# Patient Record
Sex: FEMALE | Race: WHITE | NOT HISPANIC OR LATINO | Employment: OTHER
[De-identification: names, ages, dates, MRNs, and addresses within clinical notes are randomized per-mention and may not be internally consistent; named-entity substitution may affect disease eponyms.]

---

## 2021-05-26 ENCOUNTER — RECORDS - HEALTHEAST (OUTPATIENT)
Dept: ADMINISTRATIVE | Facility: OTHER | Age: 81
End: 2021-05-26

## 2021-05-30 ENCOUNTER — AMBULATORY - HEALTHEAST (OUTPATIENT)
Dept: SURGERY | Facility: CLINIC | Age: 81
End: 2021-05-30

## 2021-05-30 DIAGNOSIS — Z11.59 ENCOUNTER FOR SCREENING FOR OTHER VIRAL DISEASES: ICD-10-CM

## 2021-06-26 DIAGNOSIS — Z11.59 ENCOUNTER FOR SCREENING FOR OTHER VIRAL DISEASES: ICD-10-CM

## 2021-07-09 RX ORDER — OXYBUTYNIN CHLORIDE 10 MG/1
10 TABLET, EXTENDED RELEASE ORAL DAILY
COMMUNITY

## 2021-07-09 RX ORDER — LISINOPRIL AND HYDROCHLOROTHIAZIDE 12.5; 2 MG/1; MG/1
1 TABLET ORAL DAILY
COMMUNITY

## 2021-07-09 RX ORDER — CALCIUM CARBONATE 500(1250)
1 TABLET ORAL 2 TIMES DAILY
Status: ON HOLD | COMMUNITY
End: 2021-07-16

## 2021-07-13 NOTE — PROVIDER NOTIFICATION
Discharge plan according to [facility]:       07/09/21 9272   Discharge Planning   Patient/Family Anticipates Transition to home with family   Living Arrangements   People in home child(tin), adult   Type of Residence Private Residence   Is your private residence a single family home or apartment? Single family home   Once home, are you able to live on one level? Yes   Which level? Main Level   Bathroom Shower/Tub Walk-in shower   Equipment Currently Used at Home none   Support System   Support Systems Children   Do you have someone available to stay with you one or two nights once you are home? Yes

## 2021-07-15 RX ORDER — ACETAMINOPHEN 325 MG/1
325-650 TABLET ORAL EVERY 6 HOURS PRN
Status: ON HOLD | COMMUNITY
End: 2021-07-16

## 2021-07-15 ASSESSMENT — MIFFLIN-ST. JEOR: SCORE: 1229.26

## 2021-07-15 NOTE — TREATMENT PLAN
Orthopedic Surgery Pre-Op Plan: Maryann Corrales  pre-op review. This is NOT an H&P   Surgeon: Dr. Phillips   [facility]: [facility]  Name of Surgery: Right Total Knee Arthroplasty   Date of Surgery: 7/16/21  H&P: Completed 6/21/21 by Dr. Carlene Wilson at [facility].   History of ASA, NSAIDS, vitamin and/or herbal supplements within 10 days: No  History of blood thinners: No    Plan:   1) Discharge Plan: Home with assist of Family. Please see Discharge Planning section near bottom of this note for further details.     2) History of Vertebral Aneurysm- S/P Coiling:     3) Hypertension: Appears well controlled on lisinopril-hydrochlorothiazide.  Instructed to hold lisinopril-hydrochlorothiazide on the day of surgery.    4) History of Breast Cancer (DCIS) S/P Lumpectomy: Currently in remission.  Follows with Dr. Alexis at [facility].    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 Alfaro at [facility].    6) Overactive Bladder: on oxybutynin.     7) Chronic Kidney Disease- Stage 3a: Most recent creatinine 1.35, GFR 37 on 6/21/2021.  Kidney function appears relatively stable. I recommend avoiding nephrotoxins like NSAIDS, promoting good post-op hydration and monitoring post-op kidney function closely.      Patient appears medically optimized for upcoming surgery. I would recommend Hospitalist Consult to assist with medical management. Please call me below with any questions on this patient.     Review of Systems Notable for: History of vertebral aneurysm-s/p coiling, hypertension, history of breast cancer-s/p lumpectomy, history of stage Ia endometrioid adenocarcinoma of right ovary-s/p total abdominal hysterectomy and bilateral salpingo-oophorectomy, overactive bladder, chronic kidney disease-stage 3a.    Past Medical History:   Past Medical History:    Diagnosis Date     Arthritis      Brain aneurysm      Hypertension      Macular degeneration      Overactive bladder      Past Surgical History:   Procedure Laterality Date     ORTHOPEDIC SURGERY         Current Medications:  Patient's Medications   New Prescriptions    No medications on file   Previous Medications    ACETAMINOPHEN (TYLENOL) 325 MG TABLET    Take 325-650 mg by mouth every 6 hours as needed for mild pain    CALCIUM CARBONATE (OS-EMILY) 500 MG TABLET    Take 1 tablet by mouth 2 times daily    LISINOPRIL-HYDROCHLOROTHIAZIDE (ZESTORETIC) 20-12.5 MG TABLET    Take 1 tablet by mouth daily    OXYBUTYNIN ER (DITROPAN-XL) 10 MG 24 HR TABLET    Take 10 mg by mouth daily   Modified Medications    No medications on file   Discontinued Medications    No medications on file       ALLERGIES:  Allergies   Allergen Reactions     Latex        Social History  Social History     Tobacco Use     Smoking status: Former Smoker     Smokeless tobacco: Never Used   Substance Use Topics     Alcohol use: Yes     Comment: 0-1 etoh per week     Drug use: Not Currently       Any Abnormal Recent Diagnostics? Yes  Creatinine 1.35, GFR 37 on 6/21/2021: Has chronic kidney disease-stage 3a.  Recommend avoiding nephrotoxins like NSAIDs and monitoring post-op kidney function closely.    Discharge Planning:   Discharge plan according to [facility]:       07/09/21 0691   Discharge Planning   Patient/Family Anticipates Transition to home with family   Living Arrangements   People in home child(tin), adult   Type of Residence Private Residence   Is your private residence a single family home or apartment? Single family home   Once home, are you able to live on one level? Yes   Which level? Main Level   Bathroom Shower/Tub Walk-in shower   Equipment Currently Used at Home none   Support System   Support Systems Children   Do you have someone available to stay with you one or two nights once you are home? Yes              Rashel  MARLEN Clay, CNP   Advanced Practice Nurse Navigator- Orthopedics  [facility]   Phone: [phone]

## 2021-07-16 ENCOUNTER — ANCILLARY PROCEDURE (OUTPATIENT)
Dept: ULTRASOUND IMAGING | Facility: CLINIC | Age: 81
End: 2021-07-16
Attending: ANESTHESIOLOGY
Payer: MEDICARE

## 2021-07-16 ENCOUNTER — ANESTHESIA EVENT (OUTPATIENT)
Dept: SURGERY | Facility: CLINIC | Age: 81
End: 2021-07-16
Payer: MEDICARE

## 2021-07-16 ENCOUNTER — ANESTHESIA (OUTPATIENT)
Dept: SURGERY | Facility: CLINIC | Age: 81
End: 2021-07-16
Payer: MEDICARE

## 2021-07-16 ENCOUNTER — HOSPITAL ENCOUNTER (OUTPATIENT)
Facility: CLINIC | Age: 81
Discharge: HOME | End: 2021-07-17
Attending: ORTHOPAEDIC SURGERY | Admitting: ORTHOPAEDIC SURGERY
Payer: MEDICARE

## 2021-07-16 DIAGNOSIS — M17.11 PRIMARY OSTEOARTHRITIS OF RIGHT KNEE: Primary | ICD-10-CM

## 2021-07-16 PROBLEM — D05.11 DUCTAL CARCINOMA IN SITU (DCIS) OF RIGHT BREAST: Status: ACTIVE | Noted: 2021-07-16

## 2021-07-16 PROBLEM — C56.1 MALIGNANT NEOPLASM OF RIGHT OVARY (H): Status: ACTIVE | Noted: 2019-10-18

## 2021-07-16 PROBLEM — D05.12 BREAST NEOPLASM, TIS (DCIS), LEFT: Status: ACTIVE | Noted: 2017-11-07

## 2021-07-16 PROBLEM — E78.5 DYSLIPIDEMIA: Status: ACTIVE | Noted: 2021-07-16

## 2021-07-16 PROBLEM — E55.9 VITAMIN D DEFICIENCY: Status: ACTIVE | Noted: 2021-07-16

## 2021-07-16 PROCEDURE — 250N000011 HC RX IP 250 OP 636: Performed by: NURSE ANESTHETIST, CERTIFIED REGISTERED

## 2021-07-16 PROCEDURE — 250N000011 HC RX IP 250 OP 636: Performed by: ANESTHESIOLOGY

## 2021-07-16 PROCEDURE — 250N000009 HC RX 250: Performed by: NURSE ANESTHETIST, CERTIFIED REGISTERED

## 2021-07-16 PROCEDURE — 258N000003 HC RX IP 258 OP 636: Performed by: ORTHOPAEDIC SURGERY

## 2021-07-16 PROCEDURE — 250N000009 HC RX 250: Performed by: PHYSICIAN ASSISTANT

## 2021-07-16 PROCEDURE — 999N000141 HC STATISTIC PRE-PROCEDURE NURSING ASSESSMENT: Performed by: ORTHOPAEDIC SURGERY

## 2021-07-16 PROCEDURE — 710N000010 HC RECOVERY PHASE 1, LEVEL 2, PER MIN: Performed by: ORTHOPAEDIC SURGERY

## 2021-07-16 PROCEDURE — 250N000011 HC RX IP 250 OP 636: Performed by: PHYSICIAN ASSISTANT

## 2021-07-16 PROCEDURE — 370N000017 HC ANESTHESIA TECHNICAL FEE, PER MIN: Performed by: ORTHOPAEDIC SURGERY

## 2021-07-16 PROCEDURE — 250N000013 HC RX MED GY IP 250 OP 250 PS 637: Performed by: ORTHOPAEDIC SURGERY

## 2021-07-16 PROCEDURE — 250N000009 HC RX 250: Performed by: ORTHOPAEDIC SURGERY

## 2021-07-16 PROCEDURE — 272N000001 HC OR GENERAL SUPPLY STERILE: Performed by: ORTHOPAEDIC SURGERY

## 2021-07-16 PROCEDURE — 258N000003 HC RX IP 258 OP 636: Performed by: ANESTHESIOLOGY

## 2021-07-16 PROCEDURE — 258N000003 HC RX IP 258 OP 636: Performed by: NURSE ANESTHETIST, CERTIFIED REGISTERED

## 2021-07-16 PROCEDURE — 250N000013 HC RX MED GY IP 250 OP 250 PS 637: Performed by: PHYSICIAN ASSISTANT

## 2021-07-16 PROCEDURE — 258N000001 HC RX 258: Performed by: ORTHOPAEDIC SURGERY

## 2021-07-16 PROCEDURE — 360N000077 HC SURGERY LEVEL 4, PER MIN: Performed by: ORTHOPAEDIC SURGERY

## 2021-07-16 PROCEDURE — C1776 JOINT DEVICE (IMPLANTABLE): HCPCS | Performed by: ORTHOPAEDIC SURGERY

## 2021-07-16 PROCEDURE — 99207 PR CDG-CODE CATEGORY CHANGED: CPT | Performed by: FAMILY MEDICINE

## 2021-07-16 PROCEDURE — 250N000009 HC RX 250: Performed by: ANESTHESIOLOGY

## 2021-07-16 PROCEDURE — 278N000051 HC OR IMPLANT GENERAL: Performed by: ORTHOPAEDIC SURGERY

## 2021-07-16 PROCEDURE — 250N000013 HC RX MED GY IP 250 OP 250 PS 637: Performed by: FAMILY MEDICINE

## 2021-07-16 PROCEDURE — 99203 OFFICE O/P NEW LOW 30 MIN: CPT | Performed by: FAMILY MEDICINE

## 2021-07-16 PROCEDURE — 250N000011 HC RX IP 250 OP 636: Performed by: ORTHOPAEDIC SURGERY

## 2021-07-16 DEVICE — IMPLANTABLE DEVICE
Type: IMPLANTABLE DEVICE | Site: KNEE | Status: FUNCTIONAL
Brand: PERSONA™

## 2021-07-16 DEVICE — IMP PATELLA ZIM KNEE ALL POLLY 35MM 42-5400-000-35: Type: IMPLANTABLE DEVICE | Site: KNEE | Status: FUNCTIONAL

## 2021-07-16 DEVICE — IMP TIBIAL ZIM PSN NP STM 5DEG SZ ER 42-5320-071-02: Type: IMPLANTABLE DEVICE | Site: KNEE | Status: FUNCTIONAL

## 2021-07-16 DEVICE — IMPLANTABLE DEVICE: Type: IMPLANTABLE DEVICE | Site: KNEE | Status: FUNCTIONAL

## 2021-07-16 DEVICE — BONE CEMENT RADIOPAQUE SIMPLEX HV FULL DOSE 6194-1-001: Type: IMPLANTABLE DEVICE | Site: KNEE | Status: FUNCTIONAL

## 2021-07-16 RX ORDER — HYDROMORPHONE HCL IN WATER/PF 6 MG/30 ML
0.2 PATIENT CONTROLLED ANALGESIA SYRINGE INTRAVENOUS
Status: DISCONTINUED | OUTPATIENT
Start: 2021-07-16 | End: 2021-07-17 | Stop reason: HOSPADM

## 2021-07-16 RX ORDER — ASPIRIN 81 MG/1
81 TABLET ORAL 2 TIMES DAILY
Status: DISCONTINUED | OUTPATIENT
Start: 2021-07-16 | End: 2021-07-17 | Stop reason: HOSPADM

## 2021-07-16 RX ORDER — HALOPERIDOL 5 MG/ML
1 INJECTION INTRAMUSCULAR
Status: DISCONTINUED | OUTPATIENT
Start: 2021-07-16 | End: 2021-07-16 | Stop reason: HOSPADM

## 2021-07-16 RX ORDER — FENTANYL CITRATE 50 UG/ML
50 INJECTION, SOLUTION INTRAMUSCULAR; INTRAVENOUS
Status: DISCONTINUED | OUTPATIENT
Start: 2021-07-16 | End: 2021-07-16 | Stop reason: HOSPADM

## 2021-07-16 RX ORDER — CEFAZOLIN SODIUM 2 G/100ML
2 INJECTION, SOLUTION INTRAVENOUS SEE ADMIN INSTRUCTIONS
Status: DISCONTINUED | OUTPATIENT
Start: 2021-07-16 | End: 2021-07-16 | Stop reason: HOSPADM

## 2021-07-16 RX ORDER — SODIUM CHLORIDE, SODIUM LACTATE, POTASSIUM CHLORIDE, CALCIUM CHLORIDE 600; 310; 30; 20 MG/100ML; MG/100ML; MG/100ML; MG/100ML
INJECTION, SOLUTION INTRAVENOUS CONTINUOUS
Status: DISCONTINUED | OUTPATIENT
Start: 2021-07-16 | End: 2021-07-16 | Stop reason: HOSPADM

## 2021-07-16 RX ORDER — VIT A/VIT C/VIT E/ZINC/COPPER 2148-113
1 TABLET ORAL 2 TIMES DAILY
COMMUNITY

## 2021-07-16 RX ORDER — GLYCOPYRROLATE 0.2 MG/ML
INJECTION, SOLUTION INTRAMUSCULAR; INTRAVENOUS PRN
Status: DISCONTINUED | OUTPATIENT
Start: 2021-07-16 | End: 2021-07-16

## 2021-07-16 RX ORDER — BUPIVACAINE HYDROCHLORIDE 5 MG/ML
INJECTION, SOLUTION EPIDURAL; INTRACAUDAL
Status: COMPLETED | OUTPATIENT
Start: 2021-07-16 | End: 2021-07-16

## 2021-07-16 RX ORDER — HYDROXYZINE HYDROCHLORIDE 10 MG/1
10 TABLET, FILM COATED ORAL EVERY 6 HOURS PRN
Status: DISCONTINUED | OUTPATIENT
Start: 2021-07-16 | End: 2021-07-17 | Stop reason: HOSPADM

## 2021-07-16 RX ORDER — POLYETHYLENE GLYCOL 3350 17 G/17G
17 POWDER, FOR SOLUTION ORAL DAILY
Status: DISCONTINUED | OUTPATIENT
Start: 2021-07-17 | End: 2021-07-17 | Stop reason: HOSPADM

## 2021-07-16 RX ORDER — DIPHENHYDRAMINE HYDROCHLORIDE 50 MG/ML
12.5 INJECTION INTRAMUSCULAR; INTRAVENOUS EVERY 6 HOURS PRN
Status: DISCONTINUED | OUTPATIENT
Start: 2021-07-16 | End: 2021-07-16 | Stop reason: HOSPADM

## 2021-07-16 RX ORDER — DEXAMETHASONE SODIUM PHOSPHATE 10 MG/ML
INJECTION INTRAMUSCULAR; INTRAVENOUS PRN
Status: DISCONTINUED | OUTPATIENT
Start: 2021-07-16 | End: 2021-07-16

## 2021-07-16 RX ORDER — LIDOCAINE 40 MG/G
CREAM TOPICAL
Status: DISCONTINUED | OUTPATIENT
Start: 2021-07-16 | End: 2021-07-16

## 2021-07-16 RX ORDER — FENTANYL CITRATE 50 UG/ML
50 INJECTION, SOLUTION INTRAMUSCULAR; INTRAVENOUS
Status: DISCONTINUED | OUTPATIENT
Start: 2021-07-16 | End: 2021-07-16

## 2021-07-16 RX ORDER — ONDANSETRON 2 MG/ML
4 INJECTION INTRAMUSCULAR; INTRAVENOUS EVERY 30 MIN PRN
Status: DISCONTINUED | OUTPATIENT
Start: 2021-07-16 | End: 2021-07-16 | Stop reason: HOSPADM

## 2021-07-16 RX ORDER — ONDANSETRON 4 MG/1
4 TABLET, ORALLY DISINTEGRATING ORAL EVERY 30 MIN PRN
Status: DISCONTINUED | OUTPATIENT
Start: 2021-07-16 | End: 2021-07-16 | Stop reason: HOSPADM

## 2021-07-16 RX ORDER — NALOXONE HYDROCHLORIDE 0.4 MG/ML
0.2 INJECTION, SOLUTION INTRAMUSCULAR; INTRAVENOUS; SUBCUTANEOUS
Status: DISCONTINUED | OUTPATIENT
Start: 2021-07-16 | End: 2021-07-17 | Stop reason: HOSPADM

## 2021-07-16 RX ORDER — ONDANSETRON 4 MG/1
4 TABLET, ORALLY DISINTEGRATING ORAL EVERY 6 HOURS PRN
Status: DISCONTINUED | OUTPATIENT
Start: 2021-07-16 | End: 2021-07-17 | Stop reason: HOSPADM

## 2021-07-16 RX ORDER — ACETAMINOPHEN 325 MG/1
650 TABLET ORAL EVERY 4 HOURS PRN
Status: DISCONTINUED | OUTPATIENT
Start: 2021-07-19 | End: 2021-07-17 | Stop reason: HOSPADM

## 2021-07-16 RX ORDER — MAGNESIUM SULFATE 4 G/50ML
4 INJECTION INTRAVENOUS ONCE
Status: DISCONTINUED | OUTPATIENT
Start: 2021-07-16 | End: 2021-07-16 | Stop reason: HOSPADM

## 2021-07-16 RX ORDER — SODIUM CHLORIDE, SODIUM LACTATE, POTASSIUM CHLORIDE, CALCIUM CHLORIDE 600; 310; 30; 20 MG/100ML; MG/100ML; MG/100ML; MG/100ML
INJECTION, SOLUTION INTRAVENOUS CONTINUOUS
Status: ACTIVE | OUTPATIENT
Start: 2021-07-16 | End: 2021-07-17

## 2021-07-16 RX ORDER — FENTANYL CITRATE 50 UG/ML
50 INJECTION, SOLUTION INTRAMUSCULAR; INTRAVENOUS EVERY 5 MIN PRN
Status: DISCONTINUED | OUTPATIENT
Start: 2021-07-16 | End: 2021-07-16 | Stop reason: HOSPADM

## 2021-07-16 RX ORDER — PROPOFOL 10 MG/ML
INJECTION, EMULSION INTRAVENOUS CONTINUOUS PRN
Status: DISCONTINUED | OUTPATIENT
Start: 2021-07-16 | End: 2021-07-16

## 2021-07-16 RX ORDER — AMOXICILLIN 250 MG
1 CAPSULE ORAL 2 TIMES DAILY
Status: DISCONTINUED | OUTPATIENT
Start: 2021-07-16 | End: 2021-07-17 | Stop reason: HOSPADM

## 2021-07-16 RX ORDER — SODIUM CHLORIDE, SODIUM LACTATE, POTASSIUM CHLORIDE, CALCIUM CHLORIDE 600; 310; 30; 20 MG/100ML; MG/100ML; MG/100ML; MG/100ML
INJECTION, SOLUTION INTRAVENOUS CONTINUOUS
Status: DISCONTINUED | OUTPATIENT
Start: 2021-07-16 | End: 2021-07-16

## 2021-07-16 RX ORDER — LISINOPRIL AND HYDROCHLOROTHIAZIDE 12.5; 2 MG/1; MG/1
1 TABLET ORAL DAILY
Status: DISCONTINUED | OUTPATIENT
Start: 2021-07-17 | End: 2021-07-17 | Stop reason: HOSPADM

## 2021-07-16 RX ORDER — ALBUTEROL SULFATE 0.83 MG/ML
2.5 SOLUTION RESPIRATORY (INHALATION) EVERY 4 HOURS PRN
Status: DISCONTINUED | OUTPATIENT
Start: 2021-07-16 | End: 2021-07-16 | Stop reason: HOSPADM

## 2021-07-16 RX ORDER — HYDROMORPHONE HCL IN WATER/PF 6 MG/30 ML
0.2 PATIENT CONTROLLED ANALGESIA SYRINGE INTRAVENOUS EVERY 5 MIN PRN
Status: DISCONTINUED | OUTPATIENT
Start: 2021-07-16 | End: 2021-07-16 | Stop reason: HOSPADM

## 2021-07-16 RX ORDER — BISACODYL 10 MG
10 SUPPOSITORY, RECTAL RECTAL DAILY PRN
Status: DISCONTINUED | OUTPATIENT
Start: 2021-07-16 | End: 2021-07-17 | Stop reason: HOSPADM

## 2021-07-16 RX ORDER — LIDOCAINE 40 MG/G
CREAM TOPICAL
Status: DISCONTINUED | OUTPATIENT
Start: 2021-07-16 | End: 2021-07-17 | Stop reason: HOSPADM

## 2021-07-16 RX ORDER — ONDANSETRON 2 MG/ML
INJECTION INTRAMUSCULAR; INTRAVENOUS PRN
Status: DISCONTINUED | OUTPATIENT
Start: 2021-07-16 | End: 2021-07-16

## 2021-07-16 RX ORDER — CEFAZOLIN SODIUM 2 G/100ML
2 INJECTION, SOLUTION INTRAVENOUS EVERY 8 HOURS
Status: COMPLETED | OUTPATIENT
Start: 2021-07-16 | End: 2021-07-17

## 2021-07-16 RX ORDER — ACETAMINOPHEN 325 MG/1
975 TABLET ORAL EVERY 8 HOURS
Status: DISCONTINUED | OUTPATIENT
Start: 2021-07-16 | End: 2021-07-17 | Stop reason: HOSPADM

## 2021-07-16 RX ORDER — MAGNESIUM SULFATE 4 G/50ML
4 INJECTION INTRAVENOUS ONCE
Status: COMPLETED | OUTPATIENT
Start: 2021-07-16 | End: 2021-07-16

## 2021-07-16 RX ORDER — BUPIVACAINE HYDROCHLORIDE 7.5 MG/ML
INJECTION, SOLUTION INTRASPINAL PRN
Status: DISCONTINUED | OUTPATIENT
Start: 2021-07-16 | End: 2021-07-16

## 2021-07-16 RX ORDER — KETAMINE HYDROCHLORIDE 50 MG/ML
INJECTION, SOLUTION INTRAMUSCULAR; INTRAVENOUS PRN
Status: DISCONTINUED | OUTPATIENT
Start: 2021-07-16 | End: 2021-07-16

## 2021-07-16 RX ORDER — PROCHLORPERAZINE MALEATE 5 MG
5 TABLET ORAL EVERY 6 HOURS PRN
Status: DISCONTINUED | OUTPATIENT
Start: 2021-07-16 | End: 2021-07-17 | Stop reason: HOSPADM

## 2021-07-16 RX ORDER — DIPHENHYDRAMINE HCL 12.5MG/5ML
12.5 LIQUID (ML) ORAL EVERY 6 HOURS PRN
Status: DISCONTINUED | OUTPATIENT
Start: 2021-07-16 | End: 2021-07-16 | Stop reason: HOSPADM

## 2021-07-16 RX ORDER — ACETAMINOPHEN 500 MG
500-1000 TABLET ORAL EVERY 6 HOURS PRN
Status: ON HOLD | COMMUNITY
End: 2024-05-30

## 2021-07-16 RX ORDER — CEFAZOLIN SODIUM 2 G/100ML
2 INJECTION, SOLUTION INTRAVENOUS
Status: COMPLETED | OUTPATIENT
Start: 2021-07-16 | End: 2021-07-16

## 2021-07-16 RX ORDER — LIDOCAINE HYDROCHLORIDE 20 MG/ML
INJECTION, SOLUTION INFILTRATION; PERINEURAL PRN
Status: DISCONTINUED | OUTPATIENT
Start: 2021-07-16 | End: 2021-07-16

## 2021-07-16 RX ORDER — ONDANSETRON 2 MG/ML
4 INJECTION INTRAMUSCULAR; INTRAVENOUS EVERY 6 HOURS PRN
Status: DISCONTINUED | OUTPATIENT
Start: 2021-07-16 | End: 2021-07-17 | Stop reason: HOSPADM

## 2021-07-16 RX ORDER — OXYCODONE HYDROCHLORIDE 5 MG/1
5 TABLET ORAL EVERY 4 HOURS PRN
Status: DISCONTINUED | OUTPATIENT
Start: 2021-07-16 | End: 2021-07-17 | Stop reason: HOSPADM

## 2021-07-16 RX ORDER — NALOXONE HYDROCHLORIDE 0.4 MG/ML
0.4 INJECTION, SOLUTION INTRAMUSCULAR; INTRAVENOUS; SUBCUTANEOUS
Status: DISCONTINUED | OUTPATIENT
Start: 2021-07-16 | End: 2021-07-17 | Stop reason: HOSPADM

## 2021-07-16 RX ORDER — MAGNESIUM HYDROXIDE 1200 MG/15ML
LIQUID ORAL PRN
Status: DISCONTINUED | OUTPATIENT
Start: 2021-07-16 | End: 2021-07-16 | Stop reason: HOSPADM

## 2021-07-16 RX ORDER — MULTIPLE VITAMINS W/ MINERALS TAB 9MG-400MCG
1 TAB ORAL DAILY
Status: DISCONTINUED | OUTPATIENT
Start: 2021-07-17 | End: 2021-07-17 | Stop reason: HOSPADM

## 2021-07-16 RX ORDER — BUPIVACAINE HYDROCHLORIDE 7.5 MG/ML
INJECTION, SOLUTION INTRASPINAL
Status: COMPLETED | OUTPATIENT
Start: 2021-07-16 | End: 2021-07-16

## 2021-07-16 RX ORDER — OXYBUTYNIN CHLORIDE 5 MG/1
10 TABLET, EXTENDED RELEASE ORAL DAILY
Status: DISCONTINUED | OUTPATIENT
Start: 2021-07-16 | End: 2021-07-17 | Stop reason: HOSPADM

## 2021-07-16 RX ORDER — HYDROMORPHONE HCL IN WATER/PF 6 MG/30 ML
0.4 PATIENT CONTROLLED ANALGESIA SYRINGE INTRAVENOUS
Status: DISCONTINUED | OUTPATIENT
Start: 2021-07-16 | End: 2021-07-17 | Stop reason: HOSPADM

## 2021-07-16 RX ORDER — OXYCODONE HYDROCHLORIDE 5 MG/1
10 TABLET ORAL EVERY 4 HOURS PRN
Status: DISCONTINUED | OUTPATIENT
Start: 2021-07-16 | End: 2021-07-17 | Stop reason: HOSPADM

## 2021-07-16 RX ORDER — TRANEXAMIC ACID 650 MG/1
1950 TABLET ORAL ONCE
Status: COMPLETED | OUTPATIENT
Start: 2021-07-16 | End: 2021-07-16

## 2021-07-16 RX ADMIN — HYDROMORPHONE HYDROCHLORIDE 1 MG: 1 INJECTION, SOLUTION INTRAMUSCULAR; INTRAVENOUS; SUBCUTANEOUS at 12:20

## 2021-07-16 RX ADMIN — PHENYLEPHRINE HYDROCHLORIDE 0.3 MCG: 10 INJECTION INTRAVENOUS at 12:15

## 2021-07-16 RX ADMIN — ASPIRIN 81 MG: 81 TABLET, DELAYED RELEASE ORAL at 21:27

## 2021-07-16 RX ADMIN — MAGNESIUM SULFATE HEPTAHYDRATE 4 G: 80 INJECTION, SOLUTION INTRAVENOUS at 11:15

## 2021-07-16 RX ADMIN — ACETAMINOPHEN 975 MG: 325 TABLET ORAL at 13:45

## 2021-07-16 RX ADMIN — KETAMINE HYDROCHLORIDE 12.5 MG: 50 INJECTION, SOLUTION INTRAMUSCULAR; INTRAVENOUS at 11:30

## 2021-07-16 RX ADMIN — DOCUSATE SODIUM 50MG AND SENNOSIDES 8.6MG 1 TABLET: 8.6; 5 TABLET, FILM COATED ORAL at 21:27

## 2021-07-16 RX ADMIN — BUPIVACAINE HYDROCHLORIDE 20 ML: 5 INJECTION, SOLUTION EPIDURAL; INTRACAUDAL; PERINEURAL at 11:23

## 2021-07-16 RX ADMIN — OXYBUTYNIN CHLORIDE 10 MG: 5 TABLET, FILM COATED, EXTENDED RELEASE ORAL at 17:02

## 2021-07-16 RX ADMIN — FENTANYL CITRATE 50 MCG: 50 INJECTION, SOLUTION INTRAMUSCULAR; INTRAVENOUS at 11:09

## 2021-07-16 RX ADMIN — ONDANSETRON 4 MG: 2 INJECTION INTRAMUSCULAR; INTRAVENOUS at 12:55

## 2021-07-16 RX ADMIN — PROPOFOL 50 MCG/KG/MIN: 10 INJECTION, EMULSION INTRAVENOUS at 11:33

## 2021-07-16 RX ADMIN — DEXAMETHASONE SODIUM PHOSPHATE 10 MG: 10 INJECTION INTRAMUSCULAR; INTRAVENOUS at 12:11

## 2021-07-16 RX ADMIN — LIDOCAINE HYDROCHLORIDE 30 MG: 20 INJECTION, SOLUTION INFILTRATION; PERINEURAL at 11:30

## 2021-07-16 RX ADMIN — KETAMINE HYDROCHLORIDE 37.5 MG: 50 INJECTION, SOLUTION INTRAMUSCULAR; INTRAVENOUS at 11:54

## 2021-07-16 RX ADMIN — SODIUM CHLORIDE, POTASSIUM CHLORIDE, SODIUM LACTATE AND CALCIUM CHLORIDE: 600; 310; 30; 20 INJECTION, SOLUTION INTRAVENOUS at 15:16

## 2021-07-16 RX ADMIN — PHENYLEPHRINE HYDROCHLORIDE 0.3 MCG/KG/MIN: 10 INJECTION INTRAVENOUS at 12:35

## 2021-07-16 RX ADMIN — BUPIVACAINE HYDROCHLORIDE 1.5 ML: 7.5 INJECTION, SOLUTION INTRASPINAL at 11:46

## 2021-07-16 RX ADMIN — CEFAZOLIN SODIUM 2 G: 2 INJECTION, SOLUTION INTRAVENOUS at 11:24

## 2021-07-16 RX ADMIN — GLYCOPYRROLATE 0.3 MG: 0.2 INJECTION, SOLUTION INTRAMUSCULAR; INTRAVENOUS at 11:55

## 2021-07-16 RX ADMIN — CEFAZOLIN SODIUM 2 G: 2 INJECTION, SOLUTION INTRAVENOUS at 21:08

## 2021-07-16 RX ADMIN — BUPIVACAINE HYDROCHLORIDE IN DEXTROSE 1.5 ML: 7.5 INJECTION, SOLUTION SUBARACHNOID at 11:46

## 2021-07-16 RX ADMIN — MIDAZOLAM HYDROCHLORIDE 1 MG: 1 INJECTION, SOLUTION INTRAMUSCULAR; INTRAVENOUS at 11:10

## 2021-07-16 RX ADMIN — TRANEXAMIC ACID 1950 MG: 650 TABLET ORAL at 10:26

## 2021-07-16 RX ADMIN — SODIUM CHLORIDE, POTASSIUM CHLORIDE, SODIUM LACTATE AND CALCIUM CHLORIDE: 600; 310; 30; 20 INJECTION, SOLUTION INTRAVENOUS at 11:00

## 2021-07-16 ASSESSMENT — MIFFLIN-ST. JEOR: SCORE: 1226.54

## 2021-07-16 NOTE — ANESTHESIA PROCEDURE NOTES
Adductor canal Procedure Note  Pre-Procedure   Staff -        Anesthesiologist:  Braulio Cedeño MD       Performed By: anesthesiologist       Location: pre-op       Procedure Start/Stop Times: 11/10/2021 11:23 AM and 7/16/2021 11:15 AM       Pre-Anesthestic Checklist: patient identified, IV checked, site marked, risks and benefits discussed, informed consent, monitors and equipment checked, pre-op evaluation, at physician/surgeon's request and post-op pain management  Timeout:       Correct Patient: Yes        Correct Procedure: Yes        Correct Site: Yes        Correct Position: Yes        Correct Laterality: Yes        Site Marked: Yes  Procedure Documentation  Procedure: Adductor canal       Diagnosis: ARTHRITIS       Laterality: right       Patient Position: supine       Skin prep: Chloraprep       Needle Type: insulated       Needle Gauge: 20.        Needle Length (Inches): 6        Ultrasound guided       1. Ultrasound was used to identify targeted nerve, plexus, vascular marker, or fascial plane and place a needle adjacent to it in real-time.       2. Ultrasound was used to visualize the spread of anesthetic in close proximity to the above referenced structure.       3. A permanent image is entered into the patient's record.       4. The visualized anatomic structures appeared normal.       5. There were no apparent abnormal pathologic findings.    Assessment/Narrative         The placement was negative for: blood aspirated, painful injection and site bleeding       Paresthesias: No.     Test dose of 3 mL at.         Test dose negative, 3 minutes after injection, for signs of intravascular, subdural, or intrathecal injection.     Bolus given via needle..        Secured via.        Insertion/Infusion Method: Single Shot       Complications: none    Medication(s) Administered   Bupivacaine 0.5% PF (Infiltration), 20 mL

## 2021-07-16 NOTE — CONSULTS
[facility] MEDICINE CONSULT NOTE   Physician requesting consult: Fortunato Phillips MD    Reason for consult: Postoperative medical management of medical co-morbidities as below    Assessment and Plan    Maryann Corrales is a 81 year old old female with a history of breast cancer, ovarian cancer, CKD 3, subarachnoid hemorrhage with brain aneurysm, hypertension, esophageal strictures and hiatal hernia, recurrent hip dislocations.  Underwent right total knee arthroplasty by Dr. Fortunato Phillips.     [facility] service was asked to evaluate patient for postoperative medical management as follows below. Please resume the home medications as reconciled and further noted below with ordered hold parameters.  Vital signs have been stable post-operatively including hemodynamically stable blood pressure and heart rate. Thank you for this consult; we will continue to follow this patient until discharge.    Procedure(s):  TOTAL KNEE ARTHROPLASTY, RIGHT  Post-operative Day: Day of Surgery  Code status:Full Code     Estimated Blood Loss:  50 mL    -Essential hypertension  -History of subarachnoid hemorrhage and brain aneurysm  Ordered home lisinopril hydrochlorothiazide with hold parameters.  -Chronic kidney disease stage III  Preoperative creatinine 1.35.  Right intravenous fluids during the evening.  Recheck creatinine level in the morning.  -Esophageal stricture  -Hiatal hernia  Not chronically on a PPI.  Will order omeprazole available as needed.  -History of breast cancer  -History of ovarian tumor  Noted.  No active management.  -Overactive bladder  Order home Ditropan XL 10 mg scheduled daily.    Hospital Problem List   No problem-specific Assessment & Plan notes found for this encounter.    Active Problems:    Knee osteoarthritis    -Reviewed the patient's preoperative H and P and updated missing elements.  -Home medication reconciliation has been reviewed.  Medications have been ordered as noted  from the home list and changes are documented above     HISTORY     Maryann Corrales is a 81 year old old female PMH significant for ovarian cancer, breast cancer, CKD 3, hypertension, esophageal stricture and hiatal hernia, recurrent hip dislocations, subarachnoid hemorrhage and brain aneurysm.  Underwent right total knee arthroplasty by Dr. Fortunato Phillips.  Patient doing well postoperatively.  Pain under good control.  No chest pain.  No shortness of breath.  No nausea or vomiting.  No lightheadedness or dizziness.  Daughter is present and supportive.  Patient denies any deficits left over from her subarachnoid hemorrhage brain aneurysm.  Preoperative creatinine 1.35.  Hemoglobin 13.2.  Has had breast cancer and a benign ovarian tumor removed.  Denies personal history of heart attack, stroke, diabetes, DVT, PE, peptic ulcer disease or sleep apnea.  Questions answered verbalize satisfaction..    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 WHICH REQUIRED COLING     Brain aneurysm      Breast neoplasm, Tis (DCIS), left 11/7/2017    Formatting of this note might be different from the original. Added automatically from request for surgery [id number] Diagnosed 2017. S/p lumpectomy 11/2017. DCIS. On tamoxifen since Dec 2017.     CKD (chronic kidney disease) stage 3, GFR 30-59 ml/min 5/26/2015     Dyslipidemia 7/16/2021     Essential hypertension 12/6/2006     Hiatal hernia 12/6/2006     Hypertension      Macular degeneration      Malignant neoplasm of right ovary (H) 10/18/2019    Formatting of this note might be different from the original. Endometroid adenocarcinoma of right ovary, stage IA s/p MORALES/BSO/omentectomy with Dr Alfaro. Follows with [facility]     Overactive bladder      Raynaud's syndrome 7/16/2021     Recurrent dislocation of right hip 4/15/2014     Stricture and  stenosis of esophagus 12/6/2006     Subarachnoid hemorrhage (H) 12/6/2006     Patient Active Problem List    Diagnosis Date Noted     Knee osteoarthritis 07/16/2021     Priority: Medium     Ductal carcinoma in situ (DCIS) of right breast 07/16/2021     Priority: Medium     Formatting of this note might be different from the original.  Diagnosed 2014       Dyslipidemia 07/16/2021     Priority: Medium     Raynaud's syndrome 07/16/2021     Priority: Medium     Vitamin D deficiency 07/16/2021     Priority: Medium     Malignant neoplasm of right ovary (H) 10/18/2019     Priority: Medium     Formatting of this note might be different from the original.  Endometroid adenocarcinoma of right ovary, stage IA s/p MORALES/BSO/omentectomy with Dr Alfaro. Follows with [facility]       Breast neoplasm, Tis (DCIS), left 11/07/2017     Priority: Medium     Formatting of this note might be different from the original.  Added automatically from request for surgery [id number]  Diagnosed 2017. S/p lumpectomy 11/2017. DCIS.  On tamoxifen since Dec 2017.       CKD (chronic kidney disease) stage 3, GFR 30-59 ml/min 05/26/2015     Priority: Medium     Overactive bladder 05/19/2015     Priority: Medium     Recurrent dislocation of right hip 04/15/2014     Priority: Medium     Basilar artery syndrome 12/06/2006     Priority: Medium     Formatting of this note might be different from the original.  WHICH REQUIRED COLING       Essential hypertension 12/06/2006     Priority: Medium     Hiatal hernia 12/06/2006     Priority: Medium     Stricture and stenosis of esophagus 12/06/2006     Priority: Medium     Subarachnoid hemorrhage (H) 12/06/2006     Priority: Medium        Surgical History   She  has a past surgical history that includes orthopedic surgery.     Past Surgical History:   Procedure Laterality Date     ORTHOPEDIC SURGERY         Family History    Reviewed, and family history is not on file.    Social History    Reviewed, and she  reports  that she has quit smoking. She has never used smokeless tobacco. She reports current alcohol use. She reports previous drug use.  Social History     Tobacco Use     Smoking status: Former Smoker     Smokeless tobacco: Never Used   Substance Use Topics     Alcohol use: Yes     Comment: 0-1 etoh per week       Allergies     Allergies   Allergen Reactions     Latex        Prior to Admission Medications      Medications Prior to Admission   Medication Sig Dispense Refill Last Dose     acetaminophen (TYLENOL) 500 MG tablet Take 500-1,000 mg by mouth every 6 hours as needed for mild pain   7/15/2021 at Unknown time     lisinopril-hydrochlorothiazide (ZESTORETIC) 20-12.5 MG tablet Take 1 tablet by mouth daily   7/15/2021 at Unknown time     Multiple Vitamins-Minerals (PRESERVISION AREDS) TABS Take 1 tablet by mouth 2 times daily   7/15/2021 at Unknown time     oxybutynin ER (DITROPAN-XL) 10 MG 24 hr tablet Take 10 mg by mouth daily   7/15/2021 at Unknown time       Review of Systems   A 12 point comprehensive review of systems was negative except as noted above.    OBJECTIVE         Physical Exam   Temp:  [97.1  F (36.2  C)-98.1  F (36.7  C)] 97.1  F (36.2  C)  Pulse:  [60-77] 69  Resp:  [10-25] 18  BP: ()/(53-88) 145/69  SpO2:  [90 %-100 %] 98 %  Body mass index is 33 kg/m .  Constitutional: awake, alert, cooperative, no apparent distress, and appears stated age  Eyes: Lids and lashes normal, pupils equal, round and reactive to light, extra ocular muscles intact, sclera clear, conjunctiva normal  ENT: Normocephalic, without obvious abnormality, atraumatic, sinuses nontender on palpation, external ears without lesions, oral pharynx with moist mucous membranes, tonsils without erythema or exudates, gums normal and good dentition.  Hematologic / Lymphatic: no cervical lymphadenopathy and no supraclavicular lymphadenopathy  Respiratory: No increased work of breathing, good air exchange, clear to auscultation  bilaterally, no crackles or wheezing  Cardiovascular: Normal apical impulse, regular rate and rhythm, normal S1 and S2, no S3 or S4, and no murmur noted  GI: No scars, normal bowel sounds, soft, non-distended, non-tender, no masses palpated, no hepatosplenomegally  Skin: normal skin color, texture, turgor, no redness, warmth, or swelling, and no rashes  Musculoskeletal: There is no redness, warmth, or swelling of the joints.  Full range of motion noted.  Motor strength is 5 out of 5 all extremities bilaterally.  Tone is normal. no lower extremity pitting edema present  Neurologic: Awake, alert, oriented to name, place and time.  Cranial nerves II-XII are grossly intact.  Motor is 5 out of 5 bilaterally.  Sensory is slightly diminished at right leg secondary to nerve block.  Neuropsychiatric: General: normal, calm and normal eye contact Level of consciousness: alert / normal Affect: normal Orientation: oriented to self, place, time and situation Memory and insight: normal, memory for past and recent events intact and thought process normal    Imaging Reviewed Personally By Myself    Radiology Results: No results found for this or any previous visit (from the past 24 hour(s)).    Labs Reviewed Personally By Myself     Results for orders placed or performed in visit on 07/16/21 (from the past 24 hour(s))   Peripheral/Paravertebral Block    Narrative    Braulio Cedeño MD     7/16/2021 11:27 AM  Adductor canal Procedure Note  Pre-Procedure   Staff -        Anesthesiologist:  Braulio Cedeño MD       Performed By: anesthesiologist       Location: pre-op       Procedure Start/Stop Times: 11/10/2021 11:23 AM and 7/16/2021 11:15   AM       Pre-Anesthestic Checklist: patient identified, IV checked, site   marked, risks and benefits discussed, informed consent, monitors and   equipment checked, pre-op evaluation, at physician/surgeon's request and   post-op pain management  Timeout:       Correct Patient: Yes        Correct  Procedure: Yes        Correct Site: Yes        Correct Position: Yes        Correct Laterality: Yes        Site Marked: Yes  Procedure Documentation  Procedure: Adductor canal       Diagnosis: ARTHRITIS       Laterality: right       Patient Position: supine       Skin prep: Chloraprep       Needle Type: insulated       Needle Gauge: 20.        Needle Length (Inches): 6        Ultrasound guided       1. Ultrasound was used to identify targeted nerve, plexus, vascular   marker, or fascial plane and place a needle adjacent to it in real-time.       2. Ultrasound was used to visualize the spread of anesthetic in close   proximity to the above referenced structure.       3. A permanent image is entered into the patient's record.       4. The visualized anatomic structures appeared normal.       5. There were no apparent abnormal pathologic findings.    Assessment/Narrative         The placement was negative for: blood aspirated, painful injection   and site bleeding       Paresthesias: No.     Test dose of 3 mL at.         Test dose negative, 3 minutes after injection, for signs of   intravascular, subdural, or intrathecal injection.     Bolus given via needle..        Secured via.        Insertion/Infusion Method: Single Shot       Complications: none    Medication(s) Administered   Bupivacaine 0.5% PF (Infiltration), 20 mL   Spinal Block    Narrative    Braulio Cedeño MD     7/16/2021 11:54 AM  Intrathecal injection Procedure Note  Pre-Procedure   Staff -        Anesthesiologist:  Braulio Cedeño MD       Performed By: anesthesiologist       Location: OR       Procedure Start/Stop Times: 7/16/2021 11:35 AM and 7/16/2021 11:46 AM       Pre-Anesthestic Checklist: patient identified, IV checked, risks and   benefits discussed, informed consent, monitors and equipment checked,   pre-op evaluation, at physician/surgeon's request and post-op pain   management  Timeout:       Correct Patient: Yes        Correct Procedure:  Yes        Correct Site: Yes        Correct Position: Yes   Procedure Documentation  Procedure: intrathecal injection       Patient Position: sitting       Skin prep: Betadine       Insertion Site: L2-3. (midline approach).       Needle Gauge: 22.        Needle Length (Inches): 4        Spinal Needle Type: PencanNo introducer used      # of attempts: 3 and  # of redirects:     Assessment/Narrative         CSF fluid: clear.    Medication(s) Administered   0.75% Hyperbaric Bupivacaine (Intrathecal), 1.5 mL  Medication Administration Time: 7/16/2021 11:46 AM    Comments:  Lot [id number]         Preoperative Labs Reviewed Personally By Myself        HEMOGLOBIN (06/21/2021 8:49 AM CDT)  HEMOGLOBIN (06/21/2021 8:49 AM CDT)   Component Value Ref Range Performed At Pathologist Signature   HEMOGLOBIN  13.2 12.0 - 16.0 g/dL [facility]     MCV  92 80 - 100 fL [facility]       HEMOGLOBIN (06/21/2021 8:49 AM CDT)   Specimen   Blood - Blood specimen (specimen)     HEMOGLOBIN (06/21/2021 8:49 AM CDT)   Performing Organization Address City/[facility]/ZIP Code Phone Number   [facility]   [address] [place] [address] [phone]     Back to top of Results       POTASSIUM (06/21/2021 8:49 AM CDT)  POTASSIUM (06/21/2021 8:49 AM CDT)   Component Value Ref Range Performed At Pathologist Signature   POTASSIUM 4.6 3.5 - 5.0 mmol/L [facility]       POTASSIUM (06/21/2021 8:49 AM CDT)   Specimen   Blood - Blood specimen (specimen)     POTASSIUM (06/21/2021 8:49 AM CDT)   Performing Organization Address City/[facility]/ZIP Code Phone Number   [facility]   [address]   [place] [address]        Back to top of Results       CREATININE (06/21/2021 8:49 AM CDT)  CREATININE (06/21/2021 8:49 AM CDT)   Component Value Ref Range Performed At Pathologist Signature    CREATININE 1.35 (H) 0.57 - 1.11 mg/dL [facility]     GFR if  45 (L) >60 ml/min/1.73m2 [facility]     GFR if not African American 37 (L) >60 ml/min/1.73m2 [facility]       CREATININE (06/21/2021 8:49 AM CDT)   Specimen   Blood - Blood specimen (specimen)     CREATININE (06/21/2021 8:49 AM CDT)   Performing Organization Address City/State/ZIP Code Phone Number   [facility]   [address] [facility] [address]           Thank you for this consultation.  Appreciate the opportunity to participate in the care of Maryann Corrales, please feel free to contact us for any questions or concerns.    Rj Major MD  Hospitalist  [facility] Medicine  [facility]  Phone: #[phone]

## 2021-07-16 NOTE — ANESTHESIA CARE TRANSFER NOTE
Patient: Maryann Corrales    Procedure(s):  TOTAL KNEE ARTHROPLASTY, RIGHT    Diagnosis: Osteoarthritis of right knee [M17.11]  Diagnosis Additional Information: No value filed.    Anesthesia Type:   Spinal     Note:    Oropharynx: oropharynx clear of all foreign objects  Level of Consciousness: drowsy  Oxygen Supplementation: room air    Independent Airway: airway patency satisfactory and stable  Dentition: dentition unchanged  Vital Signs Stable: post-procedure vital signs reviewed and stable    Patient transferred to: PACU    Handoff Report: Identifed the Patient, Identified the Reponsible Provider, Reviewed the pertinent medical history, Discussed the surgical course, Reviewed Intra-OP anesthesia mangement and issues during anesthesia, Set expectations for post-procedure period and Allowed opportunity for questions and acknowledgement of understanding      Vitals: (Last set prior to Anesthesia Care Transfer)  CRNA VITALS  7/16/2021 1247 - 7/16/2021 1321      7/16/2021             Pulse:  73    Ht Rate:  72    SpO2:  100 %        Electronically Signed By: MARLEN CAMARILLO CRNA  July 16, 2021  1:21 PM

## 2021-07-16 NOTE — ANESTHESIA PREPROCEDURE EVALUATION
Anesthesia Pre-Procedure Evaluation    Patient: Maryann Corrales   MRN: [id number] : 1940        Preoperative Diagnosis: Osteoarthritis of right knee [M17.11]   Procedure : Procedure(s):  TOTAL KNEE ARTHROPLASTY     Past Medical History:   Diagnosis Date     Arthritis      Brain aneurysm      Hypertension      Macular degeneration      Overactive bladder       Past Surgical History:   Procedure Laterality Date     ORTHOPEDIC SURGERY        Allergies   Allergen Reactions     Latex       Social History     Tobacco Use     Smoking status: Former Smoker     Smokeless tobacco: Never Used   Substance Use Topics     Alcohol use: Yes     Comment: 0-1 etoh per week      Wt Readings from Last 1 Encounters:   21 81.8 kg (180 lb 6.4 oz)        Anesthesia Evaluation   Pt has had prior anesthetic. Type: Regional.        ROS/MED HX  ENT/Pulmonary:  - neg pulmonary ROS     Neurologic: Comment: Hx aneurysm coiling      Cardiovascular:     (+) hypertension-----    METS/Exercise Tolerance:     Hematologic:  - neg hematologic  ROS     Musculoskeletal:  - neg musculoskeletal ROS     GI/Hepatic:  - neg GI/hepatic ROS     Renal/Genitourinary:     (+) renal disease, type: CRI,     Endo:  - neg endo ROS     Psychiatric/Substance Use:  - neg psychiatric ROS     Infectious Disease:  - neg infectious disease ROS     Malignancy:  - neg malignancy ROS     Other:            Physical Exam    Airway        Mallampati: I   TM distance: > 3 FB   Neck ROM: full     Respiratory Devices and Support         Dental  no notable dental history         Cardiovascular   cardiovascular exam normal          Pulmonary   pulmonary exam normal                OUTSIDE LABS:  CBC: No results found for: WBC, HGB, HCT, PLT  BMP: No results found for: NA, POTASSIUM, CHLORIDE, CO2, BUN, CR, GLC  COAGS: No results found for: PTT, INR, FIBR  POC: No results found for: BGM, HCG, HCGS  HEPATIC: No results found for: ALBUMIN, PROTTOTAL, ALT, AST, GGT, ALKPHOS,  BILITOTAL, BILIDIRECT, FELECIA  OTHER: No results found for: PH, LACT, A1C, EMILY, PHOS, MAG, LIPASE, AMYLASE, TSH, T4, T3, CRP, SED    Anesthesia Plan    ASA Status:  3      Anesthesia Type: Spinal.              Consents    Anesthesia Plan(s) and associated risks, benefits, and realistic alternatives discussed. Questions answered and patient/representative(s) expressed understanding.     - Discussed with:  Patient         Postoperative Care    Pain management: Peripheral nerve block (Single Shot).        Comments:    Add tejas FITZGERALD MD

## 2021-07-16 NOTE — OP NOTE
Operative Report    PATIENT:  Maryann Corrales    DATE OF SURGERY:  7/16/2021    SURGEON  Fortunato Phillips MD.      FIRST ASSISTANT  Barbara Castillo PA-C  (Expert MEGAN assist was required throughout for patient positioning, soft tissue retraction, appropriate use of knee instrumentation, and patient safety)     PREOPERATIVE DIAGNOSIS  right knee osteoarthritis     POSTOPERATIVE DIAGNOSIS  right knee osteoarthritis.         PROCEDURE  right Total Knee Arthroplasty.         ANESTHESIA  Spinal    SPECIMENS: none     ESTIMATED BLOOD LOSS  50cc     INDICATIONS  Ms. Maryann Corrales is a pleasant 81 year old-year-old female with an ongoing history of increasing and progressive pain in the right knee with severe disability. Pain and disability due to knee arthritis are severely affecting quality of life and ability to perform even simple activities of daily living.  X-rays have shown bone-on-bone degenerative change. Consequently after trying and failing all conservative management of knee arthritis, discussion regarding the risk and benefits of knee replacement was undertaken and the patient elected to proceed.     FINDINGS:  The operative knee showed a severe full-thickness cartilage loss on the femur and tibia in the lateral compartment of the knee.  The patellofemoral joint also showed advanced arthritic changes.      IMPLANTS  1. Virgen, Persona, PS femoral component, size 5 .  2. Virgen, Persona, tibial component, size E.  3. Virgen, Persona,  all polyethylene articular surface 12 mm thickness.  PS  4. Virgen, Persona, all polyethylene patellar button, 35mm diameter      PROCEDURE  Once consent was obtained and the operative site marked in the preop holding area, the patient was brought to the operating room.  Anesthesia was established without difficulty. All bony prominences and the non-operative leg were padded appropriately. The right leg was sterilely prepped and draped in the usual fashion after placement of  a proximal tourniquet.       The limb was exsanguinated, the tourniquet inflated and a longitudinal incision made over the knee.  Dissection was carried down through the extensor mechanism.  A medial parapatellar arthrotomy  was performed.  The patella was luxed laterally and protected. Standard medial release performed.    An intramedullary guide was used in the femur.  The distal femoral was made at 4 degrees of valgus.  The femoral cutting block  was applied and the rest of the femoral cuts completed. ACL was removed and the PCL was resected.    Attention was then turned to the tibia.  This was cut using an extramedullary tibial cutting guide perpendicular to its mechanical axis.  The trial tibial and femoral components were then placed and the knee reduced. The patella was resurfaced and found to track well. Flexion and extension gaps were appropriate and varus valgus stability was good.     The knee was copiously irrigated and all bony surfaces dried.  Cement was mixed and all components were cemented and held until firm.  The knee was again trialed.  The  Polyethylene was opened and snapped into place, the locking mechanism was ensured.  The knee was copiously irrigated. The extensor mechanism was closed with # 1 interrupted Vicryl suture. Deep dermis was closed layer-wise of # 2-0 interrupted inverted Vicryl sutures followed by running # 3-0 Monocryl in the skin.  Dressings were applied. The patient tolerated the procedure well and was returned to the postop recovery area in stable condition.          QASIM JEWELL MD

## 2021-07-16 NOTE — ANESTHESIA POSTPROCEDURE EVALUATION
Patient: Maryann Corrales    Procedure(s):  TOTAL KNEE ARTHROPLASTY, RIGHT    Diagnosis:Osteoarthritis of right knee [M17.11]  Diagnosis Additional Information: No value filed.    Anesthesia Type:  Spinal    Note:  Disposition: Admission   Postop Pain Control: Uneventful            Sign Out: Well controlled pain   PONV: No   Neuro/Psych: Uneventful            Sign Out: Acceptable/Baseline neuro status   Airway/Respiratory: Uneventful            Sign Out: Acceptable/Baseline resp. status   CV/Hemodynamics: Uneventful            Sign Out: Acceptable CV status; No obvious hypovolemia; No obvious fluid overload   Other NRE:    DID A NON-ROUTINE EVENT OCCUR? No           Last vitals:  Vitals:    07/16/21 1350 07/16/21 1400 07/16/21 1410   BP: 124/58 136/69 133/88   Pulse: 74 68 70   Resp: 16 11 12   Temp:      SpO2: 97% 98% 98%       Last vitals prior to Anesthesia Care Transfer:  CRNA VITALS  7/16/2021 1247 - 7/16/2021 1347      7/16/2021             Pulse:  73    Ht Rate:  72    SpO2:  100 %          Electronically Signed By: Crow Nathan MD  July 16, 2021  2:28 PM

## 2021-07-16 NOTE — PHARMACY-ADMISSION MEDICATION HISTORY
Pharmacy Note - Admission Medication History    Pertinent Provider Information:    ______________________________________________________________________    Prior To Admission (PTA) med list completed and updated in EMR.       Prior to Admission Medications   Prescriptions Last Dose Informant Patient Reported? Taking?   Multiple Vitamins-Minerals (PRESERVISION AREDS) TABS 7/15/2021 at Unknown time  Yes Yes   Sig: Take 1 tablet by mouth 2 times daily   acetaminophen (TYLENOL) 500 MG tablet 7/15/2021 at Unknown time  Yes Yes   Sig: Take 500-1,000 mg by mouth every 6 hours as needed for mild pain   lisinopril-hydrochlorothiazide (ZESTORETIC) 20-12.5 MG tablet 7/15/2021 at Unknown time  Yes Yes   Sig: Take 1 tablet by mouth daily   oxybutynin ER (DITROPAN-XL) 10 MG 24 hr tablet 7/15/2021 at Unknown time  Yes Yes   Sig: Take 10 mg by mouth daily      Facility-Administered Medications: None       Information source(s): [facility]    Method of interview communication: in-person    Patient was asked about OTC/herbal products specifically.  PTA med list reflects this.    Based on the pharmacist's assessment, the PTA med list information appears reliable    Allergies were reviewed, assessed, and updated with the patient.      Patient did not bring any medications to the hospital and can't retrieve from home. No multi-dose medications are available for use during hospital stay.      Thank you for the opportunity to participate in the care of this patient.      Vivek Bo [facility]     7/16/2021     10:34 AM

## 2021-07-16 NOTE — ANESTHESIA PROCEDURE NOTES
Intrathecal injection Procedure Note  Pre-Procedure   Staff -        Anesthesiologist:  Braulio Cedeño MD       Performed By: anesthesiologist       Location: OR       Procedure Start/Stop Times: 7/16/2021 11:35 AM and 7/16/2021 11:46 AM       Pre-Anesthestic Checklist: patient identified, IV checked, risks and benefits discussed, informed consent, monitors and equipment checked, pre-op evaluation, at physician/surgeon's request and post-op pain management  Timeout:       Correct Patient: Yes        Correct Procedure: Yes        Correct Site: Yes        Correct Position: Yes   Procedure Documentation  Procedure: intrathecal injection       Patient Position: sitting       Skin prep: Betadine       Insertion Site: L2-3. (midline approach).       Needle Gauge: 22.        Needle Length (Inches): 4        Spinal Needle Type: PencanNo introducer used      # of attempts: 3 and  # of redirects:     Assessment/Narrative         CSF fluid: clear.    Medication(s) Administered   0.75% Hyperbaric Bupivacaine (Intrathecal), 1.5 mL  Medication Administration Time: 7/16/2021 11:46 AM    Comments:  Lot [id number]

## 2021-07-17 VITALS
HEIGHT: 62 IN | WEIGHT: 180.4 LBS | RESPIRATION RATE: 18 BRPM | OXYGEN SATURATION: 95 % | HEART RATE: 66 BPM | TEMPERATURE: 97.7 F | DIASTOLIC BLOOD PRESSURE: 56 MMHG | SYSTOLIC BLOOD PRESSURE: 122 MMHG | BODY MASS INDEX: 33.2 KG/M2

## 2021-07-17 LAB
ANION GAP SERPL CALCULATED.3IONS-SCNC: 9 MMOL/L (ref 5–18)
BUN SERPL-MCNC: 31 MG/DL (ref 8–28)
CALCIUM SERPL-MCNC: 9 MG/DL (ref 8.5–10.5)
CHLORIDE BLD-SCNC: 104 MMOL/L (ref 98–107)
CO2 SERPL-SCNC: 26 MMOL/L (ref 22–31)
CREAT SERPL-MCNC: 1.48 MG/DL (ref 0.6–1.1)
GFR SERPL CREATININE-BSD FRML MDRD: 33 ML/MIN/1.73M2
GLUCOSE BLD-MCNC: 119 MG/DL (ref 70–125)
HGB BLD-MCNC: 11.5 G/DL (ref 11.7–15.7)
POTASSIUM BLD-SCNC: 5.3 MMOL/L (ref 3.5–5)
SODIUM SERPL-SCNC: 139 MMOL/L (ref 136–145)

## 2021-07-17 PROCEDURE — 36415 COLL VENOUS BLD VENIPUNCTURE: CPT | Performed by: ORTHOPAEDIC SURGERY

## 2021-07-17 PROCEDURE — 85018 HEMOGLOBIN: CPT | Performed by: ORTHOPAEDIC SURGERY

## 2021-07-17 PROCEDURE — 250N000011 HC RX IP 250 OP 636: Performed by: ORTHOPAEDIC SURGERY

## 2021-07-17 PROCEDURE — 250N000013 HC RX MED GY IP 250 OP 250 PS 637: Performed by: FAMILY MEDICINE

## 2021-07-17 PROCEDURE — 99213 OFFICE O/P EST LOW 20 MIN: CPT | Performed by: FAMILY MEDICINE

## 2021-07-17 PROCEDURE — 80048 BASIC METABOLIC PNL TOTAL CA: CPT | Performed by: FAMILY MEDICINE

## 2021-07-17 PROCEDURE — 250N000013 HC RX MED GY IP 250 OP 250 PS 637: Performed by: ORTHOPAEDIC SURGERY

## 2021-07-17 RX ORDER — ASPIRIN 81 MG/1
81 TABLET ORAL 2 TIMES DAILY
Qty: 60 TABLET | Refills: 0 | Status: SHIPPED | OUTPATIENT
Start: 2021-07-17 | End: 2024-05-23

## 2021-07-17 RX ORDER — OXYCODONE HYDROCHLORIDE 5 MG/1
5-10 TABLET ORAL
Qty: 30 TABLET | Refills: 0 | Status: SHIPPED | OUTPATIENT
Start: 2021-07-17 | End: 2024-05-23

## 2021-07-17 RX ORDER — AMOXICILLIN 250 MG
1-2 CAPSULE ORAL 2 TIMES DAILY
Qty: 30 TABLET | Refills: 0 | Status: SHIPPED | OUTPATIENT
Start: 2021-07-17 | End: 2024-05-23

## 2021-07-17 RX ORDER — HYDROXYZINE HYDROCHLORIDE 10 MG/1
10 TABLET, FILM COATED ORAL EVERY 6 HOURS PRN
Qty: 30 TABLET | Refills: 0 | Status: SHIPPED | OUTPATIENT
Start: 2021-07-17 | End: 2024-05-23

## 2021-07-17 RX ADMIN — ASPIRIN 81 MG: 81 TABLET, DELAYED RELEASE ORAL at 09:27

## 2021-07-17 RX ADMIN — POLYETHYLENE GLYCOL 3350 17 G: 17 POWDER, FOR SOLUTION ORAL at 09:27

## 2021-07-17 RX ADMIN — DOCUSATE SODIUM 50MG AND SENNOSIDES 8.6MG 1 TABLET: 8.6; 5 TABLET, FILM COATED ORAL at 09:28

## 2021-07-17 RX ADMIN — MULTIPLE VITAMINS W/ MINERALS TAB 1 TABLET: TAB at 09:27

## 2021-07-17 RX ADMIN — OXYBUTYNIN CHLORIDE 10 MG: 5 TABLET, FILM COATED, EXTENDED RELEASE ORAL at 09:28

## 2021-07-17 RX ADMIN — LISINOPRIL AND HYDROCHLOROTHIAZIDE 1 TABLET: 12.5; 2 TABLET ORAL at 09:28

## 2021-07-17 RX ADMIN — CEFAZOLIN SODIUM 2 G: 2 INJECTION, SOLUTION INTRAVENOUS at 05:03

## 2021-07-17 RX ADMIN — ACETAMINOPHEN 975 MG: 325 TABLET ORAL at 05:02

## 2021-07-17 NOTE — PLAN OF CARE
"Patient vital signs are at baseline: Yes  Patient able to ambulate as they were prior to admission or with assist devices provided by therapies during their stay:  No,  Reason:  Yet to ambulate  Patient MUST void prior to discharge:  No,  Reason:  Yet to void  Patient able to tolerate oral intake:  Yes  Pain has adequate pain control using Oral analgesics:  Yes      Problem: Pain (Knee Arthroplasty)  Goal: Acceptable Pain Control  Outcome: Improving  Intervention: Prevent or Manage Pain  Recent Flowsheet Documentation  Taken 7/16/2021 1515 by Little Wiggins RN  Pain Management Interventions: cold applied  Taken 7/16/2021 1500 by Little Wiggins RN  Pain Management Interventions: cold applied     Problem: Joint Function Impaired (Knee Arthroplasty)  Goal: Optimal Functional Ability  Outcome: Improving  Intervention: Promote Optimal Functional Status  Recent Flowsheet Documentation  Taken 7/16/2021 1515 by Little Wiggins RN  Assistive Device Utilized:   gait belt   walker  Activity Management: activity encouraged  Taken 7/16/2021 1500 by Little Wiggins RN  Assistive Device Utilized:   gait belt   walker  Activity Management:   activity encouraged   dorsiflexion, plantar flexion encouraged     Arrived to the floor at approximately 15:00.  Reporting no pain.  Yet to ambulate.  Yet to void.  Ate general diet for dinner.  With PO medication administration after dinner had one sudden, minimal amount of emesis, mainly mucus.  No nausea.  Stated \"this happens sometimes.\"    Plan: Monitor for urge to void, encourage ambulation.  Expected discharge tomorrow.  "

## 2021-07-17 NOTE — PROGRESS NOTES
[facility] MEDICINE PROGRESS NOTE      Code Status: Full Code  Procedure(s):  TOTAL KNEE ARTHROPLASTY, RIGHT  Post-operative Day: 1 Day Post-Op      Assessment and Plan:  81 year old old female with a history of breast cancer, ovarian cancer, CKD 3, subarachnoid hemorrhage with brain aneurysm, hypertension, esophageal strictures and hiatal hernia, recurrent hip dislocations.  Underwent right total knee arthroplasty by Dr. Fortunato Phillips.  Doing well postoperatively.  Pain under good control.  Cleared for discharge.  Postoperative creatinine up to 1.48 and potassium 5.3.  Recommend recheck with primary care provider in 1 to 2 weeks.     -Acute blood loss anemia  Preoperative hemoglobin 13.2 now down to 11.5.  No indication for transfusion at this time.  Follow per protocols.  -Essential hypertension  -History of subarachnoid hemorrhage and brain aneurysm  Continue home lisinopril hydrochlorothiazide with hold parameters.  -Chronic kidney disease stage III  Preoperative creatinine 1.35 and potassium 4.6.    Given intravenous fluids during the evening.  Recheck creatinine 1.48 with potassium 5.3.  Recommend recheck basic metabolic panel with primary care provider in 1 to 2 weeks.  -Esophageal stricture  -Hiatal hernia  Not chronically on a PPI.  Will order omeprazole available as needed.  -History of breast cancer  -History of ovarian tumor  Noted.  No active management.  -Overactive bladder  Continue home Ditropan XL 10 mg scheduled daily.    COVID-19 PCR negative from 7/12/2021  Noted.  Standard precautions.  Anticoagulation   Aspirin 81 mg twice daily per orthopedics.  Gann:Not present  Fluids: Saline lock  Pain meds: Per orthopedics  Therapy: Per orthopedics  Current Diet  Orders Placed This Encounter      Advance Diet as Tolerated: Regular Diet Adult      Discharge Instruction - Regular Diet Adult    Supplements  None      Barriers to Discharge: None    Disposition: Discharge later  today.  Discharge med rec reviewed and our area of responsibility was addressed.    Interval History/Subjective:  Doing well.  Pain under good control.  No chest pain.  No shortness of breath.  No lightheadedness.  No nausea or vomiting.  Ready for discharge home.  Questions answered to verbalized satisfaction.    Physical Exam/Objective:  Temp:  [97.1  F (36.2  C)-98.1  F (36.7  C)] 97.7  F (36.5  C)  Pulse:  [60-91] 66  Resp:  [10-25] 18  BP: ()/(53-99) 122/56  SpO2:  [90 %-100 %] 95 %    Body mass index is 33 kg/m .    Constitutional: awake, alert, cooperative, no apparent distress, and appears stated age  ENT: Normocephalic, without obvious abnormality, atraumatic, external ears without lesions, oral pharynx with moist mucous membranes, tonsils without erythema or exudates, gums normal and good dentition.  Respiratory: No increased work of breathing, good air exchange, clear to auscultation bilaterally, no crackles or wheezing  Cardiovascular: Normal apical impulse, regular rate and rhythm, normal S1 and S2, no S3 or S4, and no murmur noted  GI: No scars, normal bowel sounds, soft, non-distended, non-tender, no masses palpated, no hepatosplenomegally  Skin: normal skin color, texture, turgor, no redness, warmth, or swelling, and no rashes  Musculoskeletal: There is no redness, warmth, or swelling of the joints.  Motor strength is 5 out of 5 all extremities bilaterally.  Tone is normal. no lower extremity pitting edema present  Neurologic: Awake, alert, oriented to name, place and time.  Cranial nerves II-XII are grossly intact.  Motor is 5 out of 5 bilaterally.  Sensory is intact.  Neuropsychiatric: General: normal, calm and normal eye contact Level of consciousness: alert / normal Affect: normal Orientation: oriented to self, place, time and situation Memory and insight: normal, memory for past and recent events intact and thought process normal      Medications:   Personally Reviewed.  Medications        acetaminophen  975 mg Oral Q8H     aspirin  81 mg Oral BID     lisinopril-hydrochlorothiazide  1 tablet Oral Daily     multivitamin w/minerals  1 tablet Oral Daily     oxybutynin ER  10 mg Oral Daily     polyethylene glycol  17 g Oral Daily     senna-docusate  1 tablet Oral BID     sodium chloride (PF)  3 mL Intracatheter Q8H     sodium chloride (PF)  3 mL Intracatheter Q8H       Data reviewed today: I personally reviewed all new medications, labs, imaging/diagnostics reports over the past 24 hours. Pertinent findings include:    Imaging:   No results found for this or any previous visit (from the past 24 hour(s)).    Labs:  POC US Guidance Needle Placement    (Results Pending)   POC US Guidance Needle Placement    (Results Pending)     Recent Results (from the past 24 hour(s))   Hemoglobin    Collection Time: 07/17/21  8:32 AM   Result Value Ref Range    Hemoglobin 11.5 (L) 11.7 - 15.7 g/dL   Basic metabolic panel    Collection Time: 07/17/21  8:32 AM   Result Value Ref Range    Sodium 139 136 - 145 mmol/L    Potassium 5.3 (H) 3.5 - 5.0 mmol/L    Chloride 104 98 - 107 mmol/L    Carbon Dioxide (CO2) 26 22 - 31 mmol/L    Anion Gap 9 5 - 18 mmol/L    Urea Nitrogen 31 (H) 8 - 28 mg/dL    Creatinine 1.48 (H) 0.60 - 1.10 mg/dL    Calcium 9.0 8.5 - 10.5 mg/dL    Glucose 119 70 - 125 mg/dL    GFR Estimate 33 (L) >60 mL/min/1.73m2         Rj Major MD  [facility]  Phone: #[phone]

## 2021-07-17 NOTE — PROGRESS NOTES
Patient vital signs are at baseline: Yes  Patient able to ambulate as they were prior to admission or with assist devices provided by therapies during their stay:  Yes  Patient MUST void prior to discharge:  Yes  Patient able to tolerate oral intake:  Yes  Pain has adequate pain control using Oral analgesics:  Yes     Yesika Clay RN, 7/16/2021 10:17 PM

## 2021-07-17 NOTE — PLAN OF CARE
Discharged to home.  Family transport.  Information given on where to collect e-scribed medications, and left with paper scrip in hand for oxycodone.  Reviewed AVS.  Verbalized understanding.

## 2021-07-17 NOTE — PROGRESS NOTES
Patient vital signs are at baseline: Yes  Patient able to ambulate as they were prior to admission or with assist devices provided by therapies during their stay:  Yes  Patient MUST void prior to discharge:  Yes  Patient able to tolerate oral intake:  Yes  Pain has adequate pain control using Oral analgesics:  Yes     Yesika Clay RN, 7/17/2021 5:18 AM

## 2021-07-17 NOTE — PLAN OF CARE
"Problem: Bleeding (Knee Arthroplasty)  Goal: Absence of Bleeding  Outcome: Improving     Problem: Pain (Knee Arthroplasty)  Goal: Acceptable Pain Control  Outcome: Improving     Problem: Postoperative Urinary Retention (Knee Arthroplasty)  Goal: Effective Urinary Elimination  Outcome: Improving     Pt is assist x1 with gait belt and walker. VSS. /61 (BP Location: Right arm)   Pulse 64   Temp 97.7  F (36.5  C) (Oral)   Resp 16   Ht 1.575 m (5' 2\")   Wt 81.8 kg (180 lb 6.4 oz)   SpO2 99%   BMI 33.00 kg/m    O2 on 1 LPM via NC. LS diminished. BS active. HR regular. Denies pain. CMS intact. Scheduled tylenol given. Dressing to RLE was changed in beginning of shift, see previous note for details. Dressing is now CDI. Dried drainage noted. Adequate intake and output. Will continue to monitor.    Yesika Clay RN, 7/17/2021 5:46 AM    "

## 2021-07-17 NOTE — DISCHARGE SUMMARY
"  Maryann Corrales,  1940, MRN [id number]    Admission Date: 2021  Admission Diagnoses: Osteoarthritis of right knee [M17.11]  Knee osteoarthritis [M17.10]     Discharge Date:  21    Post-operative Day:  1 Day Post-Op    Reason for Admission: The patient was admitted for the following:  Right total knee arthroplasty    Brief Hospital Course: This 81 year old female underwent the aforementioned procedure.. There were no intraoperative complications and the patient was transferred to the recovery room and later the orthopedic unit in stable condition. Once the patient reached the orthopedic floor our orthopedic pain protocol was implemented along with the following:  Therapy: PT/OT  Anticoagulation Medications: ASA    Complications during admission: None  Consultations during admission: Hospitalist service for medical management     Pertinent Results at Discharge:    No results found for: HGB, INR, PLT  /61 (BP Location: Right arm)   Pulse 64   Temp 97.7  F (36.5  C) (Oral)   Resp 16   Ht 1.575 m (5' 2\")   Wt 81.8 kg (180 lb 6.4 oz)   SpO2 99%   BMI 33.00 kg/m      Active Problems:    Knee osteoarthritis    CKD (chronic kidney disease) stage 3, GFR 30-59 ml/min    Essential hypertension    Hiatal hernia      Discharge Information:  Condition at discharge: Good, improving  Discharge destination: Home    Medications at discharge:    Maryann Corrales   Home Medication Instructions MEENAKSHI:[id number]    Printed on:21 0814   Medication Information                      acetaminophen (TYLENOL) 500 MG tablet  Take 500-1,000 mg by mouth every 6 hours as needed for mild pain             lisinopril-hydrochlorothiazide (ZESTORETIC) 20-12.5 MG tablet  Take 1 tablet by mouth daily             Multiple Vitamins-Minerals (PRESERVISION AREDS) TABS  Take 1 tablet by mouth 2 times daily             oxybutynin ER (DITROPAN-XL) 10 MG 24 hr tablet  Take 10 mg by mouth daily                 Activity: " WBAT    Follow-up Care:  The patient will be followed in our office in 2 weeks or sooner should the need arise.  Patient should follow up with their PCP as directed.  Patient was seen by myself on the date of discharge.    -Patient did have some drainage of wound last night, none on exam this morning. Will have patient keep leg straight for 24 hours. Will contact our office for any concerns    Luís Castillo PA-C    Date: 7/17/2021  Time: 8:14 AM

## 2021-07-17 NOTE — PROGRESS NOTES
Pt  Ambulated to bathroom and writer was notified of drainage coming from RLE ace wrap. Moderate amount of drainage noted on ace wrap. Writer assessed and found mepilex to be saturated. MD paged. Orders to removed dressing and apply a clean one. See flow sheet for details. Extremity was iced, elevated and pressure dressing applied. Will continue to monitor.    Yesika Clay RN, 7/16/2021 8:21 PM

## 2021-07-17 NOTE — PLAN OF CARE
Patient vital signs are at baseline: Yes  Patient able to ambulate as they were prior to admission or with assist devices provided by therapies during their stay:  Yes  Patient MUST void prior to discharge:  Yes  Patient able to tolerate oral intake:  Yes  Pain has adequate pain control using Oral analgesics:  Yes    Problem: Joint Function Impaired (Knee Arthroplasty)  Goal: Optimal Functional Ability  Intervention: Promote Optimal Functional Status  Recent Flowsheet Documentation  Taken 7/17/2021 0745 by Little Wiggins RN  Assistive Device Utilized:   gait belt   walker  Activity Management: up in chair     Problem: Pain (Knee Arthroplasty)  Goal: Acceptable Pain Control  Intervention: Prevent or Manage Pain  Recent Flowsheet Documentation  Taken 7/17/2021 0745 by Little Wiggins RN  Pain Management Interventions: food       Reporting no pain.  Maintaining ice to RLE.  Per discussion with provider, OT/PT cancelled, and rest encouraged.  Ambulating with A x 1, walker and gait belt to bathroom only, otherwise leg elevated.  Witnessed dorsi- planter-flexion while sitting.  VSS.  Able to express needs.    Plan: Expected discharge home today.

## 2024-05-06 NOTE — PROGRESS NOTES
Discharge plan according to [facility]:       04/29/24 1401   Discharge Planning   Patient/Family Anticipates Transition to home   Living Arrangements   People in Home child(tin), adult;sibling(s)   Type of Residence Private Residence   Is your private residence a single family home or apartment? Single family home   Number of Stairs, Within Home, Primary ten   Stair Railings, Within Home, Primary railings safe and in good condition  (has a chair lift)   Once home, are you able to live on one level? Yes   Which level? Main Level   Bathroom Shower/Tub Walk-in shower   Equipment Currently Used at Home raised toilet seat;walker, rolling   Support System   Support Systems Children;Family Members   Do you have someone available to stay with you one or two nights once you are home? Yes  (daughter and/or sister)

## 2024-05-23 NOTE — H&P (VIEW-ONLY)
[facility]      Preoperative Consultation   Maryann Corrales   : 1940   Gender: female    Date of Encounter: 2024    Nursing Notes:   Zara Grewal RN  2024 10:45 AM  Signed  Maryann Corrales is a 83 y.o. female (1940) who presents for preop evaluation undergoing LEFT TOTAL KNEE ARTHROPLASTY for treatment of left knee.    Date of Surgery: 2024  Surgical Specialty: orthopedic Dr Noland  [facility]Surgical Facility: [facility]  Fax number: [phone]  Surgery type: inpatient  Primary Physician: Carlene Altamirano. CHERYL, BSN ........... 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     Review of Systems   A comprehensive review of systems was negative except for items noted in HPI.    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     Overactive bladder N32.81     CKD (chronic kidney disease) stage 3, GFR 30-59 ml/min (HC) N18.30     Cataracts, bilateral H26.9     AMD (age-related macular degeneration), bilateral H35.30     Primary osteoarthritis of right hip M16.11     Breast neoplasm, Tis (DCIS),  left D05.12     Pelvic mass R19.00     Cortical senile cataract, bilateral H25.013     Nuclear sclerotic cataract, bilateral H25.13     Presbyopia H52.4     Regular astigmatism, bilateral H52.223     Hyperopia, bilateral H52.03     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 Max acetaminophen dose: 4000mg in 24 hrs.     amoxicillin (AMOXIL) 500 mg tablet      calcium carbonate-vitamin D3, 500 mg-400 units, (OSCAL 500 + D) tablet Take 1 tablet by mouth 2 times daily before meals.     lisinopril-hydrochlorothiazide 20-12.5 mg tablet (PRINZIDE) Take 1 Tablet by mouth once daily.     nystatin (MYCOSTATIN) cream Apply topically to affected area(s) two times daily.     oxybutynin XL (DITROPAN XL) 10 mg CR tablet Take 1 Tablet (10 mg) by mouth once daily.     PRESERVISION 226 MG-200 UNIT-5 MG CAP Takes one capsule by mouth twice daily     No current facility-administered medications for this visit.     Allergies   Allergen Reactions     Adhesive Tape-Silicones Rash     Latex *Unknown     Other [Unlisted Allergen (Include Detail In Comments)] Rash     Dermabond (surgical glue)     Past Surgical History:   . Laterality Date     (IA) [place] CLOSED TX TOE FX      right 3rd     ARTHROPLASTY REVISION RIGHT HIP ACETABULAR COMPONENT SUPINE Right 4/16/14    Dr. [facility]     ARTHROPLASTY RIGHT  HIP DIRECT ANTERIOR APPROACH  1-14-14     BREAST LUMPECTOMY Right 2014     BREAST LUMPECTOMY Left 2017     CATARACT EXTRACTION W/  INTRAOCULAR LENS IMPLANT Right 10/31/2019     "Dr. ZAINAB Clement     CATARACT EXTRACTION W/  INTRAOCULAR LENS IMPLANT Left 2019    Dr. Clement     COLONOSCOPY SCREENING  2012    q 5 years     DILATION AND CURETTAGE       EXPLORATORY LAPAROTOMY. OPEN INCISION OF UTERUS, CERVIX, BILATERAL FALLOPIAN TUBES AND OVARIES, BILATERAL PELVIC AND PERIAORTIC LYMPH NODE DISSECTION, WASHINGS  2019     IR ANGIOGRAM CAROTID/CEREBRAL (IA)  10/20/04     RIGHT GLUTEUS MEDIUS TENDON REPAIR Right 14     YAG CAPSULOTOMY Right 2020     Social History     Tobacco Use     Smoking status: Former     Current packs/day: 0.00     Average packs/day: 1 pack/day for 15.0 years (15.0 ttl pk-yrs)     Types: Cigarettes     Start date: 1983     Quit date: 1998     Years since quittin.4     Smokeless tobacco: Never     Tobacco comments:     SHE SMOKED 1 PPD FOR 15 YEARS, BUT QUIT SMOKING.   Vaping Use     Vaping status: Never Used   Substance Use Topics     Alcohol use: Yes     Alcohol/week: 10.0 standard drinks of alcohol     Comment: occasional     Drug use: No     Family History   Problem Relation Age of Onset     Hypertension Father      Cancer-breast Mother 70        lived for 20 years after dx     Other Mother         AMD     Cancer-breast Sister         RAD 51C gene-increased risk gyn cancer      Good Health Sister      Cancer-breast Maternal Aunt         no details known     Cancer-ovarian No Family History      PAST DIFFICULTY WITH ANESTHESIA: None     Physical Exam   /56 (Cuff Site: Right Arm, Position: Sitting, Cuff Size: Adult Regular)   Pulse 67   Temp 98.5  F (36.9  C) (Tympanic)   Resp 18   Ht 1.6 m (5' 2.99\")   Wt 76.6 kg (168 lb 12.8 oz)   SpO2 99%   BMI 29.91 kg/m   Body mass index is 29.91 kg/m .    General Appearance: Pleasant, alert, appropriate appearance for age. No acute distress  Head Exam: Normal. Normocephalic, atraumatic.  Eye Exam:  Normal external eye, conjunctiva, lids. DANNIELLE.  Ear Exam: Normal TM's bilaterally. Normal " auditory canals and external ears. Non-tender.  OroPharynx Exam:  Dental hygiene adequate. Normal buccal mucosa. Normal pharynx.  Neck Exam:  Supple, no masses or nodes.  Chest/Respiratory Exam: Normal chest wall and respirations. Clear to auscultation.  Cardiovascular Exam: Regular rate and rhythm. No lower extremity edema  Gastrointestinal Exam: Soft, non-tender  Musculoskeletal Exam: uses wheeled walker.  Antalgic gait.  Skin: no rash or abnormalities on exposed skin  Neurologic Exam: Nonfocal, normal gross motor, tone coordination and no tremor.  Psychiatric Exam: Alert and oriented - appropriate affect.     Assessment / Plan       The Pre-Op Tool    Recommendations      Intermediate Risk Procedure    Risk of CV Complication (RCRI)  0.5%    Current Cardiac Status  Good exertional capacity ( > 4 mets )    Cardiac History  No history of coronary artery disease           Labs  HGB within last 30 days  Potassium within last 30 days  Creatinine within last 30 days  EKG  Baseline EKG within the last 12 months  CXR  Not indicated    Stress Testing  Not indicated    * Testing recommendations are intended to assist, but not direct, clinical decisions.           Type & Screen should be obtained by Anesthesia only if the risk of transfusion is > 5% for the procedure         Hold Lisinopril / HCTZ the evening before and/or morning of the procedure.  Take your other medications as usual prior to the procedure  Hold vitamins and/or supplements for 1 week prior to the procedure  Okay to take Acetaminophen (Tylenol) up until the procedure  Hold / avoid NSAIDs (e.g. ibuprofen, naproxen) prior to procedure: 2 days for ibuprofen (Advil) and 4 days for naproxen (Aleve).    * Medication recommendations are not intended to be exhaustive; they are limited to common medications that are potentially dangerous if incorrectly managed          Labs  * Data supports elimination of  routine  laboratory testing in favor of focused,   indicated  testing based on medical co-morbidities. A 2009 study randomized 1061 patients undergoing ambulatory, non-cataract surgery to routine or to indicated testing. Perioperative adverse events were similar (Anesthesia & Analgesia 2009;108:467-75; Anesthesiol. Clin. 2016 Mar;34(1):43-58).  Stress Testing  * The current ACC/AHA guideline states that 'non-invasive stress testing is not useful for patients [with no clinical risk factors] undergoing noncardiac surgery' (JACC. 2014;64(21);e1-76.).     Session ID: [id number]  Endnotes and bibliography available upon request: [email or web address]    Labs: pending  ECG: yes NSR    ICD-10-CM    1. Pre-op exam  Z01.818 HEMOGLOBIN     BASIC METABOLIC PANEL     PA READING EKG - NO CHARGE, COMP ONLY     EKG 12 LEAD     PA ECG ROUTINE ECG W/LEAST 12 LDS W/I&R      2. Primary osteoarthritis of left knee  M17.12 HEMOGLOBIN     BASIC METABOLIC PANEL     PA READING EKG - NO CHARGE, COMP ONLY     EKG 12 LEAD     PA ECG ROUTINE ECG W/LEAST 12 LDS W/I&R          Patient is cleared for planned procedure.   Electronically Signed by:   Zara Yuen NP ....................  5/23/2024   12:48 PM  5/23/2024

## 2024-05-29 ENCOUNTER — ANESTHESIA (OUTPATIENT)
Dept: SURGERY | Facility: CLINIC | Age: 84
End: 2024-05-29
Payer: MEDICARE

## 2024-05-29 ENCOUNTER — HOSPITAL ENCOUNTER (OUTPATIENT)
Facility: CLINIC | Age: 84
Discharge: HOME | End: 2024-05-30
Attending: ORTHOPAEDIC SURGERY | Admitting: ORTHOPAEDIC SURGERY
Payer: MEDICARE

## 2024-05-29 ENCOUNTER — ANESTHESIA EVENT (OUTPATIENT)
Dept: SURGERY | Facility: CLINIC | Age: 84
End: 2024-05-29
Payer: MEDICARE

## 2024-05-29 DIAGNOSIS — M17.12 PRIMARY OSTEOARTHRITIS OF LEFT KNEE: Primary | ICD-10-CM

## 2024-05-29 PROBLEM — M17.9 KNEE OSTEOARTHRITIS: Status: ACTIVE | Noted: 2021-07-16

## 2024-05-29 PROBLEM — I73.00 RAYNAUD'S SYNDROME: Status: ACTIVE | Noted: 2021-07-16

## 2024-05-29 LAB
ANION GAP SERPL CALCULATED.3IONS-SCNC: 11 MMOL/L (ref 7–15)
BUN SERPL-MCNC: 20.1 MG/DL (ref 8–23)
CALCIUM SERPL-MCNC: 8.9 MG/DL (ref 8.8–10.2)
CHLORIDE SERPL-SCNC: 105 MMOL/L (ref 98–107)
CREAT SERPL-MCNC: 1.02 MG/DL (ref 0.51–0.95)
DEPRECATED HCO3 PLAS-SCNC: 27 MMOL/L (ref 22–29)
EGFRCR SERPLBLD CKD-EPI 2021: 54 ML/MIN/1.73M2
GLUCOSE SERPL-MCNC: 116 MG/DL (ref 70–99)
HOLD SPECIMEN: NORMAL
POTASSIUM SERPL-SCNC: 3.6 MMOL/L (ref 3.4–5.3)
SODIUM SERPL-SCNC: 143 MMOL/L (ref 135–145)

## 2024-05-29 PROCEDURE — 36415 COLL VENOUS BLD VENIPUNCTURE: CPT | Performed by: ORTHOPAEDIC SURGERY

## 2024-05-29 PROCEDURE — 250N000011 HC RX IP 250 OP 636: Mod: JZ | Performed by: ANESTHESIOLOGY

## 2024-05-29 PROCEDURE — C1776 JOINT DEVICE (IMPLANTABLE): HCPCS | Performed by: ORTHOPAEDIC SURGERY

## 2024-05-29 PROCEDURE — 258N000003 HC RX IP 258 OP 636: Performed by: ORTHOPAEDIC SURGERY

## 2024-05-29 PROCEDURE — 710N000010 HC RECOVERY PHASE 1, LEVEL 2, PER MIN: Performed by: ORTHOPAEDIC SURGERY

## 2024-05-29 PROCEDURE — 250N000009 HC RX 250: Performed by: ANESTHESIOLOGY

## 2024-05-29 PROCEDURE — 370N000017 HC ANESTHESIA TECHNICAL FEE, PER MIN: Performed by: ORTHOPAEDIC SURGERY

## 2024-05-29 PROCEDURE — 250N000013 HC RX MED GY IP 250 OP 250 PS 637: Performed by: PHYSICIAN ASSISTANT

## 2024-05-29 PROCEDURE — 360N000077 HC SURGERY LEVEL 4, PER MIN: Performed by: ORTHOPAEDIC SURGERY

## 2024-05-29 PROCEDURE — 999N000141 HC STATISTIC PRE-PROCEDURE NURSING ASSESSMENT: Performed by: ORTHOPAEDIC SURGERY

## 2024-05-29 PROCEDURE — 250N000013 HC RX MED GY IP 250 OP 250 PS 637: Performed by: ORTHOPAEDIC SURGERY

## 2024-05-29 PROCEDURE — 258N000003 HC RX IP 258 OP 636: Performed by: NURSE ANESTHETIST, CERTIFIED REGISTERED

## 2024-05-29 PROCEDURE — 258N000001 HC RX 258: Performed by: ORTHOPAEDIC SURGERY

## 2024-05-29 PROCEDURE — 80048 BASIC METABOLIC PNL TOTAL CA: CPT | Performed by: ORTHOPAEDIC SURGERY

## 2024-05-29 PROCEDURE — 258N000003 HC RX IP 258 OP 636: Performed by: ANESTHESIOLOGY

## 2024-05-29 PROCEDURE — 272N000001 HC OR GENERAL SUPPLY STERILE: Performed by: ORTHOPAEDIC SURGERY

## 2024-05-29 PROCEDURE — 250N000011 HC RX IP 250 OP 636: Performed by: PHYSICIAN ASSISTANT

## 2024-05-29 PROCEDURE — 250N000011 HC RX IP 250 OP 636: Mod: JZ | Performed by: ORTHOPAEDIC SURGERY

## 2024-05-29 PROCEDURE — C1713 ANCHOR/SCREW BN/BN,TIS/BN: HCPCS | Performed by: ORTHOPAEDIC SURGERY

## 2024-05-29 PROCEDURE — 99214 OFFICE O/P EST MOD 30 MIN: CPT | Performed by: FAMILY MEDICINE

## 2024-05-29 DEVICE — IMPLANTABLE DEVICE
Type: IMPLANTABLE DEVICE | Site: KNEE | Status: FUNCTIONAL
Brand: PERSONA® VIVACIT-E®

## 2024-05-29 DEVICE — IMPLANTABLE DEVICE
Type: IMPLANTABLE DEVICE | Site: KNEE | Status: FUNCTIONAL
Brand: PERSONA®

## 2024-05-29 DEVICE — IMPLANTABLE DEVICE
Type: IMPLANTABLE DEVICE | Site: KNEE | Status: FUNCTIONAL
Brand: PERSONA™

## 2024-05-29 DEVICE — SIMPLEX® HV IS A FAST-SETTING ACRYLIC RESIN FOR USE IN BONE SURGERY. MIXING THE TWO SEPARATE STERILE COMPONENTS PRODUCES A DUCTILE BONE CEMENT WHICH, AFTER HARDENING, FIXES THE IMPLANT AND TRANSFERS STRESSES PRODUCED DURING MOVEMENT EVENLY TO THE BONE. SIMPLEX® HV CEMENT POWDER ALSO CONTAINS INSOLUBLE ZIRCONIUM DIOXIDE AS AN X-RAY CONTRAST MEDIUM. SIMPLEX® HV DOES NOT EMIT A SIGNAL AND DOES NOT POSE A SAFETY RISK IN A MAGNETIC RESONANCE ENVIRONMENT.
Type: IMPLANTABLE DEVICE | Site: KNEE | Status: FUNCTIONAL
Brand: SIMPLEX HV

## 2024-05-29 DEVICE — IMPLANTABLE DEVICE
Type: IMPLANTABLE DEVICE | Site: KNEE | Status: FUNCTIONAL
Brand: PERSONA® NATURAL TIBIA®

## 2024-05-29 RX ORDER — OXYCODONE HYDROCHLORIDE 5 MG/1
10 TABLET ORAL EVERY 4 HOURS PRN
Status: DISCONTINUED | OUTPATIENT
Start: 2024-05-29 | End: 2024-05-30 | Stop reason: HOSPADM

## 2024-05-29 RX ORDER — OXYBUTYNIN CHLORIDE 5 MG/1
10 TABLET, EXTENDED RELEASE ORAL DAILY
Status: DISCONTINUED | OUTPATIENT
Start: 2024-05-29 | End: 2024-05-30 | Stop reason: HOSPADM

## 2024-05-29 RX ORDER — HYDROMORPHONE HCL IN WATER/PF 6 MG/30 ML
0.4 PATIENT CONTROLLED ANALGESIA SYRINGE INTRAVENOUS
Status: DISCONTINUED | OUTPATIENT
Start: 2024-05-29 | End: 2024-05-30 | Stop reason: HOSPADM

## 2024-05-29 RX ORDER — HYDROMORPHONE HCL IN WATER/PF 6 MG/30 ML
0.2 PATIENT CONTROLLED ANALGESIA SYRINGE INTRAVENOUS
Status: DISCONTINUED | OUTPATIENT
Start: 2024-05-29 | End: 2024-05-30 | Stop reason: HOSPADM

## 2024-05-29 RX ORDER — ACETAMINOPHEN 325 MG/1
650 TABLET ORAL EVERY 4 HOURS PRN
Qty: 100 TABLET | Refills: 0 | Status: SHIPPED | OUTPATIENT
Start: 2024-05-29

## 2024-05-29 RX ORDER — FENTANYL CITRATE 50 UG/ML
INJECTION, SOLUTION INTRAMUSCULAR; INTRAVENOUS PRN
Status: DISCONTINUED | OUTPATIENT
Start: 2024-05-29 | End: 2024-05-29

## 2024-05-29 RX ORDER — AMOXICILLIN 250 MG
1-2 CAPSULE ORAL 2 TIMES DAILY
Qty: 30 TABLET | Refills: 0 | Status: SHIPPED | OUTPATIENT
Start: 2024-05-29

## 2024-05-29 RX ORDER — POLYETHYLENE GLYCOL 3350 17 G/17G
17 POWDER, FOR SOLUTION ORAL DAILY
Status: DISCONTINUED | OUTPATIENT
Start: 2024-05-30 | End: 2024-05-30 | Stop reason: HOSPADM

## 2024-05-29 RX ORDER — CEFAZOLIN SODIUM/WATER 2 G/20 ML
2 SYRINGE (ML) INTRAVENOUS
Status: COMPLETED | OUTPATIENT
Start: 2024-05-29 | End: 2024-05-29

## 2024-05-29 RX ORDER — FENTANYL CITRATE 50 UG/ML
50 INJECTION, SOLUTION INTRAMUSCULAR; INTRAVENOUS
Status: COMPLETED | OUTPATIENT
Start: 2024-05-29 | End: 2024-05-29

## 2024-05-29 RX ORDER — TRANEXAMIC ACID 650 MG/1
1950 TABLET ORAL ONCE
Status: COMPLETED | OUTPATIENT
Start: 2024-05-29 | End: 2024-05-29

## 2024-05-29 RX ORDER — OXYCODONE HYDROCHLORIDE 5 MG/1
5 TABLET ORAL EVERY 4 HOURS PRN
Status: DISCONTINUED | OUTPATIENT
Start: 2024-05-29 | End: 2024-05-30 | Stop reason: HOSPADM

## 2024-05-29 RX ORDER — NALOXONE HYDROCHLORIDE 0.4 MG/ML
0.1 INJECTION, SOLUTION INTRAMUSCULAR; INTRAVENOUS; SUBCUTANEOUS
Status: DISCONTINUED | OUTPATIENT
Start: 2024-05-29 | End: 2024-05-29 | Stop reason: HOSPADM

## 2024-05-29 RX ORDER — PROCHLORPERAZINE MALEATE 5 MG
5 TABLET ORAL EVERY 6 HOURS PRN
Status: DISCONTINUED | OUTPATIENT
Start: 2024-05-29 | End: 2024-05-30 | Stop reason: HOSPADM

## 2024-05-29 RX ORDER — NALOXONE HYDROCHLORIDE 0.4 MG/ML
0.4 INJECTION, SOLUTION INTRAMUSCULAR; INTRAVENOUS; SUBCUTANEOUS
Status: DISCONTINUED | OUTPATIENT
Start: 2024-05-29 | End: 2024-05-30 | Stop reason: HOSPADM

## 2024-05-29 RX ORDER — LIDOCAINE 40 MG/G
CREAM TOPICAL
Status: DISCONTINUED | OUTPATIENT
Start: 2024-05-29 | End: 2024-05-30 | Stop reason: HOSPADM

## 2024-05-29 RX ORDER — CEFAZOLIN SODIUM/WATER 2 G/20 ML
2 SYRINGE (ML) INTRAVENOUS SEE ADMIN INSTRUCTIONS
Status: DISCONTINUED | OUTPATIENT
Start: 2024-05-29 | End: 2024-05-29 | Stop reason: HOSPADM

## 2024-05-29 RX ORDER — NALOXONE HYDROCHLORIDE 0.4 MG/ML
0.2 INJECTION, SOLUTION INTRAMUSCULAR; INTRAVENOUS; SUBCUTANEOUS
Status: DISCONTINUED | OUTPATIENT
Start: 2024-05-29 | End: 2024-05-30 | Stop reason: HOSPADM

## 2024-05-29 RX ORDER — ONDANSETRON 4 MG/1
4 TABLET, ORALLY DISINTEGRATING ORAL EVERY 6 HOURS PRN
Status: DISCONTINUED | OUTPATIENT
Start: 2024-05-29 | End: 2024-05-30 | Stop reason: HOSPADM

## 2024-05-29 RX ORDER — LIDOCAINE 40 MG/G
CREAM TOPICAL
Status: DISCONTINUED | OUTPATIENT
Start: 2024-05-29 | End: 2024-05-29 | Stop reason: HOSPADM

## 2024-05-29 RX ORDER — BUPIVACAINE HYDROCHLORIDE 5 MG/ML
INJECTION, SOLUTION EPIDURAL; INTRACAUDAL
Status: COMPLETED | OUTPATIENT
Start: 2024-05-29 | End: 2024-05-29

## 2024-05-29 RX ORDER — ONDANSETRON 2 MG/ML
4 INJECTION INTRAMUSCULAR; INTRAVENOUS EVERY 30 MIN PRN
Status: DISCONTINUED | OUTPATIENT
Start: 2024-05-29 | End: 2024-05-29 | Stop reason: HOSPADM

## 2024-05-29 RX ORDER — LISINOPRIL AND HYDROCHLOROTHIAZIDE 12.5; 2 MG/1; MG/1
1 TABLET ORAL DAILY
Status: DISCONTINUED | OUTPATIENT
Start: 2024-05-30 | End: 2024-05-30 | Stop reason: HOSPADM

## 2024-05-29 RX ORDER — SODIUM CHLORIDE, SODIUM LACTATE, POTASSIUM CHLORIDE, CALCIUM CHLORIDE 600; 310; 30; 20 MG/100ML; MG/100ML; MG/100ML; MG/100ML
INJECTION, SOLUTION INTRAVENOUS CONTINUOUS
Status: DISCONTINUED | OUTPATIENT
Start: 2024-05-29 | End: 2024-05-29 | Stop reason: HOSPADM

## 2024-05-29 RX ORDER — SODIUM CHLORIDE, SODIUM LACTATE, POTASSIUM CHLORIDE, CALCIUM CHLORIDE 600; 310; 30; 20 MG/100ML; MG/100ML; MG/100ML; MG/100ML
INJECTION, SOLUTION INTRAVENOUS CONTINUOUS PRN
Status: DISCONTINUED | OUTPATIENT
Start: 2024-05-29 | End: 2024-05-29

## 2024-05-29 RX ORDER — ONDANSETRON 4 MG/1
4 TABLET, ORALLY DISINTEGRATING ORAL EVERY 30 MIN PRN
Status: DISCONTINUED | OUTPATIENT
Start: 2024-05-29 | End: 2024-05-29 | Stop reason: HOSPADM

## 2024-05-29 RX ORDER — ACETAMINOPHEN 325 MG/1
975 TABLET ORAL EVERY 8 HOURS
Qty: 27 TABLET | Refills: 0 | Status: DISCONTINUED | OUTPATIENT
Start: 2024-05-29 | End: 2024-05-30 | Stop reason: HOSPADM

## 2024-05-29 RX ORDER — LIDOCAINE HYDROCHLORIDE 10 MG/ML
INJECTION, SOLUTION INFILTRATION; PERINEURAL PRN
Status: DISCONTINUED | OUTPATIENT
Start: 2024-05-29 | End: 2024-05-29

## 2024-05-29 RX ORDER — HYDROMORPHONE HCL IN WATER/PF 6 MG/30 ML
0.2 PATIENT CONTROLLED ANALGESIA SYRINGE INTRAVENOUS EVERY 5 MIN PRN
Status: DISCONTINUED | OUTPATIENT
Start: 2024-05-29 | End: 2024-05-29 | Stop reason: HOSPADM

## 2024-05-29 RX ORDER — HYDROMORPHONE HCL IN WATER/PF 6 MG/30 ML
0.4 PATIENT CONTROLLED ANALGESIA SYRINGE INTRAVENOUS EVERY 5 MIN PRN
Status: DISCONTINUED | OUTPATIENT
Start: 2024-05-29 | End: 2024-05-29 | Stop reason: HOSPADM

## 2024-05-29 RX ORDER — CEFAZOLIN SODIUM 2 G/100ML
2 INJECTION, SOLUTION INTRAVENOUS EVERY 8 HOURS
Qty: 200 ML | Refills: 0 | Status: COMPLETED | OUTPATIENT
Start: 2024-05-29 | End: 2024-05-30

## 2024-05-29 RX ORDER — DEXAMETHASONE SODIUM PHOSPHATE 4 MG/ML
INJECTION, SOLUTION INTRA-ARTICULAR; INTRALESIONAL; INTRAMUSCULAR; INTRAVENOUS; SOFT TISSUE PRN
Status: DISCONTINUED | OUTPATIENT
Start: 2024-05-29 | End: 2024-05-29

## 2024-05-29 RX ORDER — HYDRALAZINE HYDROCHLORIDE 20 MG/ML
10 INJECTION INTRAMUSCULAR; INTRAVENOUS EVERY 6 HOURS PRN
Status: DISCONTINUED | OUTPATIENT
Start: 2024-05-29 | End: 2024-05-30 | Stop reason: HOSPADM

## 2024-05-29 RX ORDER — AMOXICILLIN 250 MG
1 CAPSULE ORAL 2 TIMES DAILY
Status: DISCONTINUED | OUTPATIENT
Start: 2024-05-29 | End: 2024-05-30 | Stop reason: HOSPADM

## 2024-05-29 RX ORDER — ONDANSETRON 2 MG/ML
4 INJECTION INTRAMUSCULAR; INTRAVENOUS EVERY 6 HOURS PRN
Status: DISCONTINUED | OUTPATIENT
Start: 2024-05-29 | End: 2024-05-30 | Stop reason: HOSPADM

## 2024-05-29 RX ORDER — BISACODYL 10 MG
10 SUPPOSITORY, RECTAL RECTAL DAILY PRN
Status: DISCONTINUED | OUTPATIENT
Start: 2024-06-01 | End: 2024-05-30 | Stop reason: HOSPADM

## 2024-05-29 RX ORDER — DEXAMETHASONE SODIUM PHOSPHATE 4 MG/ML
4 INJECTION, SOLUTION INTRA-ARTICULAR; INTRALESIONAL; INTRAMUSCULAR; INTRAVENOUS; SOFT TISSUE
Status: DISCONTINUED | OUTPATIENT
Start: 2024-05-29 | End: 2024-05-29 | Stop reason: HOSPADM

## 2024-05-29 RX ORDER — OXYCODONE HYDROCHLORIDE 5 MG/1
5-10 TABLET ORAL EVERY 4 HOURS PRN
Qty: 30 TABLET | Refills: 0 | Status: SHIPPED | OUTPATIENT
Start: 2024-05-29

## 2024-05-29 RX ORDER — PROPOFOL 10 MG/ML
INJECTION, EMULSION INTRAVENOUS PRN
Status: DISCONTINUED | OUTPATIENT
Start: 2024-05-29 | End: 2024-05-29

## 2024-05-29 RX ORDER — ACETAMINOPHEN 325 MG/1
650 TABLET ORAL EVERY 4 HOURS PRN
Status: DISCONTINUED | OUTPATIENT
Start: 2024-06-01 | End: 2024-05-30 | Stop reason: HOSPADM

## 2024-05-29 RX ORDER — ASPIRIN 81 MG/1
81 TABLET ORAL 2 TIMES DAILY
Qty: 60 TABLET | Refills: 0 | Status: SHIPPED | OUTPATIENT
Start: 2024-05-29

## 2024-05-29 RX ORDER — FENTANYL CITRATE 50 UG/ML
25 INJECTION, SOLUTION INTRAMUSCULAR; INTRAVENOUS EVERY 5 MIN PRN
Status: DISCONTINUED | OUTPATIENT
Start: 2024-05-29 | End: 2024-05-29 | Stop reason: HOSPADM

## 2024-05-29 RX ORDER — ASPIRIN 81 MG/1
81 TABLET ORAL 2 TIMES DAILY
Status: DISCONTINUED | OUTPATIENT
Start: 2024-05-29 | End: 2024-05-30 | Stop reason: HOSPADM

## 2024-05-29 RX ORDER — SODIUM CHLORIDE, SODIUM LACTATE, POTASSIUM CHLORIDE, CALCIUM CHLORIDE 600; 310; 30; 20 MG/100ML; MG/100ML; MG/100ML; MG/100ML
INJECTION, SOLUTION INTRAVENOUS CONTINUOUS
Status: DISCONTINUED | OUTPATIENT
Start: 2024-05-29 | End: 2024-05-30 | Stop reason: HOSPADM

## 2024-05-29 RX ORDER — PROPOFOL 10 MG/ML
INJECTION, EMULSION INTRAVENOUS CONTINUOUS PRN
Status: DISCONTINUED | OUTPATIENT
Start: 2024-05-29 | End: 2024-05-29

## 2024-05-29 RX ORDER — FENTANYL CITRATE 50 UG/ML
50 INJECTION, SOLUTION INTRAMUSCULAR; INTRAVENOUS EVERY 5 MIN PRN
Status: DISCONTINUED | OUTPATIENT
Start: 2024-05-29 | End: 2024-05-29 | Stop reason: HOSPADM

## 2024-05-29 RX ADMIN — BUPIVACAINE HYDROCHLORIDE 15 ML: 5 INJECTION, SOLUTION EPIDURAL; INTRACAUDAL; PERINEURAL at 10:42

## 2024-05-29 RX ADMIN — SODIUM CHLORIDE, POTASSIUM CHLORIDE, SODIUM LACTATE AND CALCIUM CHLORIDE: 600; 310; 30; 20 INJECTION, SOLUTION INTRAVENOUS at 11:53

## 2024-05-29 RX ADMIN — ACETAMINOPHEN 975 MG: 325 TABLET ORAL at 16:17

## 2024-05-29 RX ADMIN — CEFAZOLIN SODIUM 2 G: 2 INJECTION, SOLUTION INTRAVENOUS at 20:36

## 2024-05-29 RX ADMIN — PROPOFOL 60 MCG/KG/MIN: 10 INJECTION, EMULSION INTRAVENOUS at 12:12

## 2024-05-29 RX ADMIN — PROPOFOL 40 MG: 10 INJECTION, EMULSION INTRAVENOUS at 12:10

## 2024-05-29 RX ADMIN — SODIUM CHLORIDE, POTASSIUM CHLORIDE, SODIUM LACTATE AND CALCIUM CHLORIDE: 600; 310; 30; 20 INJECTION, SOLUTION INTRAVENOUS at 10:33

## 2024-05-29 RX ADMIN — TRANEXAMIC ACID 1950 MG: 650 TABLET ORAL at 10:02

## 2024-05-29 RX ADMIN — MEPIVACAINE HYDROCHLORIDE 3 ML: 15 INJECTION, SOLUTION EPIDURAL; INFILTRATION at 00:03

## 2024-05-29 RX ADMIN — MIDAZOLAM HYDROCHLORIDE 1 MG: 1 INJECTION, SOLUTION INTRAMUSCULAR; INTRAVENOUS at 10:42

## 2024-05-29 RX ADMIN — FENTANYL CITRATE 25 MCG: 50 INJECTION INTRAMUSCULAR; INTRAVENOUS at 11:57

## 2024-05-29 RX ADMIN — FENTANYL CITRATE 25 MCG: 50 INJECTION INTRAMUSCULAR; INTRAVENOUS at 12:19

## 2024-05-29 RX ADMIN — DEXAMETHASONE SODIUM PHOSPHATE 5 MG: 4 INJECTION, SOLUTION INTRA-ARTICULAR; INTRALESIONAL; INTRAMUSCULAR; INTRAVENOUS; SOFT TISSUE at 12:30

## 2024-05-29 RX ADMIN — ACETAMINOPHEN 975 MG: 325 TABLET ORAL at 23:14

## 2024-05-29 RX ADMIN — FENTANYL CITRATE 50 MCG: 50 INJECTION, SOLUTION INTRAMUSCULAR; INTRAVENOUS at 10:43

## 2024-05-29 RX ADMIN — SENNOSIDES AND DOCUSATE SODIUM 1 TABLET: 50; 8.6 TABLET ORAL at 20:36

## 2024-05-29 RX ADMIN — LIDOCAINE HYDROCHLORIDE 50 MG: 10 INJECTION, SOLUTION INFILTRATION; PERINEURAL at 12:12

## 2024-05-29 RX ADMIN — PHENYLEPHRINE HYDROCHLORIDE 100 MCG: 10 INJECTION INTRAVENOUS at 12:13

## 2024-05-29 RX ADMIN — Medication 2 G: at 12:02

## 2024-05-29 RX ADMIN — SODIUM CHLORIDE, POTASSIUM CHLORIDE, SODIUM LACTATE AND CALCIUM CHLORIDE: 600; 310; 30; 20 INJECTION, SOLUTION INTRAVENOUS at 13:02

## 2024-05-29 RX ADMIN — DEXAMETHASONE SODIUM PHOSPHATE 5 MG: 4 INJECTION, SOLUTION INTRA-ARTICULAR; INTRALESIONAL; INTRAMUSCULAR; INTRAVENOUS; SOFT TISSUE at 12:39

## 2024-05-29 RX ADMIN — ASPIRIN 81 MG: 81 TABLET, COATED ORAL at 20:36

## 2024-05-29 RX ADMIN — DEXAMETHASONE SODIUM PHOSPHATE 5 MG: 4 INJECTION, SOLUTION INTRA-ARTICULAR; INTRALESIONAL; INTRAMUSCULAR; INTRAVENOUS; SOFT TISSUE at 12:15

## 2024-05-29 RX ADMIN — SODIUM CHLORIDE, POTASSIUM CHLORIDE, SODIUM LACTATE AND CALCIUM CHLORIDE: 600; 310; 30; 20 INJECTION, SOLUTION INTRAVENOUS at 22:37

## 2024-05-29 RX ADMIN — FENTANYL CITRATE 25 MCG: 50 INJECTION INTRAMUSCULAR; INTRAVENOUS at 12:51

## 2024-05-29 RX ADMIN — SODIUM CHLORIDE, POTASSIUM CHLORIDE, SODIUM LACTATE AND CALCIUM CHLORIDE: 600; 310; 30; 20 INJECTION, SOLUTION INTRAVENOUS at 20:37

## 2024-05-29 ASSESSMENT — ACTIVITIES OF DAILY LIVING (ADL)
ADLS_ACUITY_SCORE: 32
ADLS_ACUITY_SCORE: 27
ADLS_ACUITY_SCORE: 32
ADLS_ACUITY_SCORE: 27
ADLS_ACUITY_SCORE: 32
ADLS_ACUITY_SCORE: 27
ADLS_ACUITY_SCORE: 32
ADLS_ACUITY_SCORE: 27
ADLS_ACUITY_SCORE: 32
ADLS_ACUITY_SCORE: 27
ADLS_ACUITY_SCORE: 32
ADLS_ACUITY_SCORE: 32

## 2024-05-29 NOTE — ANESTHESIA PROCEDURE NOTES
"Intrathecal injection Procedure Note    Pre-Procedure   Staff -        Anesthesiologist:  Facundo Lau MD       Performed By: anesthesiologist       Location: OR       Procedure Start/Stop Times: 5/29/2024 12:03 AM and 5/29/2024 12:09 AM       Pre-Anesthestic Checklist: patient identified, IV checked, risks and benefits discussed, informed consent, monitors and equipment checked, pre-op evaluation, at physician/surgeon's request and post-op pain management  Timeout:       Correct Patient: Yes        Correct Procedure: Yes        Correct Site: Yes        Correct Position: Yes   Procedure Documentation  Procedure: intrathecal injection       Patient Position: sitting       Skin prep: Chloraprep       Insertion Site: L3-4. (midline approach).       Needle Gauge: 25.        Needle Length (Inches): 3.5        Spinal Needle Type: Pencan       Introducer used       # of attempts: 1 and  # of redirects:     Assessment/Narrative         Paresthesias: No.       CSF fluid: clear.       Opening pressure was cmH2O while  Sitting.      Medication(s) Administered   1.5% Mepivacaine PF (Intrathecal) - Intrathecal   3 mL - 5/29/2024 12:03:00 AM  Medication Administration Time: 5/29/2024 12:03 AM      FOR [facility] ([facility] [place]) ONLY:   Pain Team Contact information: please page the Pain Team Via [employer]. Search \"Pain\". During daytime hours, please page the attending first. At night please page the resident first.      "

## 2024-05-29 NOTE — TREATMENT PLAN
Orthopedic Surgery Pre-Op Plan: Maryann Corrales  pre-op review. This is NOT an H&P   Surgeon: Dr. Phillips   [facility]: [facility]  Name of Surgery: Left Total Knee Arthroplasty   Date of Surgery: 5/29/24  H&P: Completed on 5/23/24 by Zara Yuen NP at [facility].   History of ASA, NSAIDS, vitamin and/or herbal supplements, GLP-1 Agonist or SGLT Inhibitor medication taken within 10 days?: Yes- Multivitamins-patient was instructed to hold these for 7 days before surgery.   History of blood thinners?: No    Plan:   1) Discharge Plan: Home morning of POD 1 with assist of Family (Daughter and Sister). Please see Discharge Planning section near bottom of this note for further details.     2) History of Basilar Artery Syndrome and Subarachnoid Hemorrhage: S/P Coiling in 12/2006: reports some residual LLE weakness.     3) Hypertension: Appears well controlled on lisinopril-hydrochlorothiazide.  Instructed to hold lisinopril-hydrochlorothiazide on the day of surgery.    4) Dyslipidemia: Not on statin.    5) History of Breast Cancer (DCIS) S/P Lumpectomy: Currently in remission.  Follows with Dr. Alexis at [facility].    6) History of Stage 1a Endometrioid Adenocarcinoma of Right Ovary- S/P Total Abdominal Hysterectomy, Bilateral Salpingo-Oophorectomy 5/2019: Follows with Dr. Alfaro at [facility].    7) Chronic Kidney Disease- Stage 3a: Creatinine 1.10, GFR 49, BUN 27 on 5/23/2024.  Appears baseline creatinine is typically in 1.1-1.2 range. I recommend avoiding nephrotoxins like NSAIDS, promoting good post-op hydration and monitoring post-op kidney function closely.       8) Overactive Bladder: on oxybutynin.     Patient appears medically optimized for upcoming surgery. I would recommend Hospitalist Consult to assist with medical management. Please call me below with any questions on this patient.       Review of Systems Notable for: History of basilar artery syndrome  and subarachnoid hemorrhage-s/p coiling in 12/2006, hypertension, dyslipidemia, history of breast cancer-s/p lumpectomy, history of adenocarcinoma of right ovary-s/p total abdominal hysterectomy, bilateral salpingo-oophorectomy 5/2019, overactive bladder.  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 WHICH REQUIRED COLING    Brain aneurysm     Breast neoplasm, Tis (DCIS), left 11/07/2017    Formatting of this note might be different from the original. Added automatically from request for surgery [id number] Diagnosed 2017. S/p lumpectomy 11/2017. DCIS. On tamoxifen since Dec 2017.    Cerebral artery occlusion with cerebral infarction (H)     CKD (chronic kidney disease) stage 3, GFR 30-59 ml/min (H) 05/26/2015    Dyslipidemia 07/16/2021    Essential hypertension 12/06/2006    Hiatal hernia 12/06/2006    Hypertension     Macular degeneration     Malignant neoplasm of right ovary (H) 10/18/2019    Formatting of this note might be different from the original. Endometroid adenocarcinoma of right ovary, stage IA s/p MORALES/BSO/omentectomy with Dr Alfaro. Follows with [facility]    Overactive bladder     Raynaud's syndrome 07/16/2021    Recurrent dislocation of right hip 04/15/2014    Stricture and stenosis of esophagus 12/06/2006    Subarachnoid hemorrhage (H) 12/06/2006     Past Surgical History:   Procedure Laterality Date    ARTHROPLASTY KNEE Right 7/16/2021    Procedure: TOTAL KNEE ARTHROPLASTY, RIGHT;  Surgeon: Fortunato Phillips MD;  Location: [facility] OR    ORTHOPEDIC SURGERY         Current Medications:  Patient's Medications   New Prescriptions    No medications on file   Previous Medications    ACETAMINOPHEN (TYLENOL) 500 MG TABLET    Take 500-1,000 mg by mouth every 6 hours as needed for mild pain    LISINOPRIL-HYDROCHLOROTHIAZIDE (ZESTORETIC) 20-12.5 MG TABLET     Take 1 tablet by mouth daily    MULTIPLE VITAMINS-MINERALS (PRESERVISION AREDS) TABS    Take 1 tablet by mouth 2 times daily    OXYBUTYNIN ER (DITROPAN-XL) 10 MG 24 HR TABLET    Take 10 mg by mouth daily   Modified Medications    No medications on file   Discontinued Medications    ASPIRIN 81 MG EC TABLET    Take 1 tablet (81 mg) by mouth 2 times daily    HYDROXYZINE (ATARAX) 10 MG TABLET    Take 1 tablet (10 mg) by mouth every 6 hours as needed for itching or anxiety (with pain, moderate pain)    OXYCODONE (ROXICODONE) 5 MG TABLET    Take 1-2 tablets (5-10 mg) by mouth every 3 hours as needed for pain (Moderate to Severe)    SENNA-DOCUSATE (SENOKOT-S/PERICOLACE) 8.6-50 MG TABLET    Take 1-2 tablets by mouth 2 times daily Take while on oral narcotics to prevent or treat constipation.       ALLERGIES:  Allergies   Allergen Reactions    Adhesive Tape Rash     Pt unsure. Redness on skin after removal of surgical dressing    Latex Rash     Pt unsure       Social History  Social History     Tobacco Use    Smoking status: Former    Smokeless tobacco: Never   Vaping Use    Vaping status: Never Used   Substance Use Topics    Alcohol use: Yes     Comment: 0-2 etoh per week    Drug use: Not Currently       Any Abnormal Recent Diagnostics? Yes  Creatinine 1.10, GFR 49, BUN 27 on 5/23/2024: Shows stable chronic kidney disease-stage 3a.     Discharge Planning:   Discharge plan according to [facility]:     Home morning of POD 1 with assist of Family (Daughter and Sister)     04/29/24 1401   Discharge Planning   Patient/Family Anticipates Transition to home   Living Arrangements   People in Home child(tin), adult;sibling(s)   Type of Residence Private Residence   Is your private residence a single family home or apartment? Single family home   Number of Stairs, Within Home, Primary ten   Stair Railings, Within Home, Primary railings safe and in good condition  (has a chair lift)   Once home, are you able to live on one  level? Yes   Which level? Main Level   Bathroom Shower/Tub Walk-in shower   Equipment Currently Used at Home raised toilet seat;walker, rolling   Support System   Support Systems Children;Family Members   Do you have someone available to stay with you one or two nights once you are home? Yes  (daughter and/or sister)       MARLEN Pierce, CNP   Advanced Practice Nurse Navigator- Orthopedics  [facility]   Phone: [phone]

## 2024-05-29 NOTE — OP NOTE
Operative Report    PATIENT:  Maryann Corrales    DATE OF SURGERY:  5/29/2024    SURGEON  Fortunato Phillips MD.      FIRST ASSISTANT  Dianelys Castillo PA-C  (Expert MEGAN assist was required throughout for patient positioning, soft tissue retraction, appropriate use of knee instrumentation, and patient safety)     PREOPERATIVE DIAGNOSIS  left knee osteoarthritis     POSTOPERATIVE DIAGNOSIS  left knee osteoarthritis.         PROCEDURE  left Total Knee Arthroplasty.         ANESTHESIA  Spinal    SPECIMENS: none     ESTIMATED BLOOD LOSS  100cc     INDICATIONS  Ms. Maryann Corrales is a pleasant 83 year old-year-old female with an ongoing history of increasing and progressive pain in the left knee with severe disability. Pain and disability due to knee arthritis are severely affecting quality of life and ability to perform even simple activities of daily living.  X-rays have shown bone-on-bone degenerative change. Consequently after trying and failing all conservative management of knee arthritis, discussion regarding the risk and benefits of knee replacement was undertaken and the patient elected to proceed.     FINDINGS:  The operative knee showed a severe full-thickness cartilage loss on the femur and tibia in the lateral compartment of the knee.  The patellofemoral joint also showed advanced arthritic changes.      IMPLANTS  1. Virgen, Persona, CR femoral component, size 5 .  2. Virgen, Persona, tibial component, size E.  3. Virgen, Persona,  all polyethylene articular surface 11 mm thickness.  MC  4. Virgen, Persona, all polyethylene patellar button, 35mm diameter      PROCEDURE  Once consent was obtained and the operative site marked in the preop holding area, the patient was brought to the operating room.  Anesthesia was established without difficulty. All bony prominences and the non-operative leg were padded appropriately. The left leg was sterilely prepped and draped in the usual fashion after placement of a  proximal tourniquet.  Tourniquet was never inflated.   A longitudinal incision made over the knee.  Dissection was carried down through the extensor mechanism.  A medial parapatellar arthrotomy  was performed.  The patella was luxed laterally and protected. Standard medial release performed.    An intramedullary guide was used in the femur.  The distal femoral was made at 4 degrees of valgus.  The femoral cutting block  was applied and the rest of the femoral cuts completed. ACL was removed and the PCL was recessed.    Attention was then turned to the tibia.  This was cut using an extramedullary tibial cutting guide perpendicular to its mechanical axis.  The trial tibial and femoral components were then placed and the knee reduced. The patella was resurfaced and found to track well. Flexion and extension gaps were appropriate and varus valgus stability was good.     The knee was copiously irrigated and all bony surfaces dried.  Cement was mixed and all components were cemented and held until firm.  The knee was again trialed.  The  Polyethylene was opened and snapped into place, the locking mechanism was ensured.  The knee was copiously irrigated. The extensor mechanism was closed with # 2 interrupted Vicryl suture and #1 stratafix.. Deep dermis was closed layer-wise of # 2-0 interrupted inverted Vicryl sutures followed by skin closure.  Dressings were applied. The patient tolerated the procedure well and was returned to the postop recovery area in stable condition.          FORTUNATO JEWELL MD    @C(1)@  Fortunato Jewell MD    @C(2)@  Carlene Wilson

## 2024-05-29 NOTE — ANESTHESIA CARE TRANSFER NOTE
Patient: Maryann Corrales    Procedure: Procedure(s):  LEFT TOTAL KNEE ARTHROPLASTY       Diagnosis: Osteoarthritis of left knee [M17.12]  Diagnosis Additional Information: No value filed.    Anesthesia Type:   Spinal     Note:      Level of Consciousness: awake  Oxygen Supplementation: face mask  Level of Supplemental Oxygen (L/min / FiO2): 6  Independent Airway: airway patency satisfactory and stable    Vital Signs Stable: post-procedure vital signs reviewed and stable  Report to RN Given: handoff report given  Patient transferred to: PACU    Handoff Report: Identifed the Patient, Identified the Reponsible Provider, Reviewed the pertinent medical history, Discussed the surgical course, Reviewed Intra-OP anesthesia mangement and issues during anesthesia, Set expectations for post-procedure period and Allowed opportunity for questions and acknowledgement of understanding      Vitals:  Vitals Value Taken Time   BP     Temp     Pulse 62 05/29/24 1332   Resp 16 05/29/24 1332   SpO2 100 % 05/29/24 1332   Vitals shown include unfiled device data.    Electronically Signed By: MARLEN Denton CRNA  May 29, 2024  1:33 PM

## 2024-05-29 NOTE — ANESTHESIA PROCEDURE NOTES
"Saphenous Procedure Note    Pre-Procedure   Staff -        Anesthesiologist:  Facundo Lau MD       Performed By: anesthesiologist       Location: pre-op       Procedure Start/Stop Times: 5/29/2024 10:42 AM and 5/29/2024 10:45 AM       Pre-Anesthestic Checklist: patient identified, IV checked, site marked, risks and benefits discussed, informed consent, monitors and equipment checked, pre-op evaluation, at physician/surgeon's request and post-op pain management  Timeout:       Correct Patient: Yes        Correct Procedure: Yes        Correct Site: Yes        Correct Position: Yes        Correct Laterality: Yes        Site Marked: Yes  Procedure Documentation  Procedure: Saphenous       Laterality: left       Patient Position: supine       Skin prep: Chloraprep       Needle Type: short bevel       Needle Gauge: 20.        Needle Length (Inches): 4        1. Ultrasound was used to identify targeted nerve, plexus, vascular marker, or fascial plane and place a needle adjacent to it in real-time.       2. Ultrasound was used to visualize the spread of anesthetic in close proximity to the above referenced structure.       3. A permanent image is entered into the patient's record.       4. The visualized anatomic structures appeared normal.       5. There were no apparent abnormal pathologic findings.    Assessment/Narrative         The placement was negative for: blood aspirated, painful injection and site bleeding       Paresthesias: No.       Bolus given via needle. no blood aspirated via catheter.        Secured via.        Insertion/Infusion Method: Single Shot       Complications: none    Medication(s) Administered   Bupivacaine 0.5% PF (Infiltration) - Infiltration   15 mL - 5/29/2024 10:42:00 AM  Medication Administration Time: 5/29/2024 10:42 AM      FOR [facility] ([facility]) ONLY:   Pain Team Contact information: please page the Pain Team Via [employer]. Search \"Pain\". During daytime hours, please page the attending " first. At night please page the resident first.

## 2024-05-29 NOTE — PROVIDER NOTIFICATION
Patient had a run of a heat beat similar to SVT. Heart beat went from 50's to 90's suddenly and then hit 6-7 beats of SVT (Rate 133/min). Patient was asymptomatic and VS remained stable then converted back to NSR on it's own. Dr. Judi MACE was notified and no interventions was needed at this time. Continue to monitor.

## 2024-05-29 NOTE — PHARMACY-ADMISSION MEDICATION HISTORY
Pharmacist Admission Medication History    Admission medication history is complete. The information provided in this note is only as accurate as the sources available at the time of the update.    Information Source(s): Patient and CareEverywhere/SureScripts via in-person    Pertinent Information: n/a    Changes made to PTA medication list:  Added: None  Deleted: None  Changed: None    Allergies reviewed with patient and updates made in EHR: yes    Medication History Completed By: Benita Galvan [facility] 5/29/2024 10:16 AM    PTA Med List   Medication Sig Last Dose    acetaminophen (TYLENOL) 500 MG tablet Take 500-1,000 mg by mouth every 6 hours as needed for mild pain Unknown at prn    lisinopril-hydrochlorothiazide (ZESTORETIC) 20-12.5 MG tablet Take 1 tablet by mouth daily 5/28/2024 at am    Multiple Vitamins-Minerals (PRESERVISION AREDS) TABS Take 1 tablet by mouth 2 times daily Past Week    oxybutynin ER (DITROPAN-XL) 10 MG 24 hr tablet Take 10 mg by mouth daily 5/29/2024 at am

## 2024-05-29 NOTE — ANESTHESIA PREPROCEDURE EVALUATION
Anesthesia Pre-Procedure Evaluation    Patient: Maryann Corrales   MRN: [id number] : 1940        Preoperative Diagnosis: Osteoarthritis of right knee [M17.11]   Procedure : Procedure(s):  TOTAL KNEE ARTHROPLASTY     Past Medical History:   Diagnosis Date    Arthritis     Basilar artery syndrome 2006    Formatting of this note might be different from the original. WHICH REQUIRED COLING    Brain aneurysm     Breast neoplasm, Tis (DCIS), left 2017    Formatting of this note might be different from the original. Added automatically from request for surgery [id number] Diagnosed . S/p lumpectomy 2017. DCIS. On tamoxifen since Dec 2017.    Cerebral artery occlusion with cerebral infarction (H)     CKD (chronic kidney disease) stage 3, GFR 30-59 ml/min (H) 2015    Dyslipidemia 2021    Essential hypertension 2006    Hiatal hernia 2006    Hypertension     Macular degeneration     Malignant neoplasm of right ovary (H) 10/18/2019    Formatting of this note might be different from the original. Endometroid adenocarcinoma of right ovary, stage IA s/p MORALES/BSO/omentectomy with Dr Alfaro. Follows with [facility]    Overactive bladder     Raynaud's syndrome 2021    Recurrent dislocation of right hip 04/15/2014    Stricture and stenosis of esophagus 2006    Subarachnoid hemorrhage (H) 2006      Past Surgical History:   Procedure Laterality Date    ARTHROPLASTY KNEE Right 2021    Procedure: TOTAL KNEE ARTHROPLASTY, RIGHT;  Surgeon: Fortunato Phillips MD;  Location: [facility] Main OR    ORTHOPEDIC SURGERY        Allergies   Allergen Reactions    Adhesive Tape Rash     Pt unsure. Redness on skin after removal of surgical dressing    Latex Rash     Pt unsure      Social History     Tobacco Use    Smoking status: Former    Smokeless tobacco: Never   Substance Use Topics    Alcohol use: Yes     Comment: 0-2 etoh per week      Wt Readings from Last 1 Encounters:  "  05/29/24 73.5 kg (162 lb)        Anesthesia Evaluation   Pt has had prior anesthetic. Type: Regional.        ROS/MED HX  ENT/Pulmonary:  - neg pulmonary ROS     Neurologic: Comment: Hx aneurysm coiling      Cardiovascular:     (+)  hypertension- -   -  - -                                      METS/Exercise Tolerance:     Hematologic:  - neg hematologic  ROS     Musculoskeletal:  - neg musculoskeletal ROS     GI/Hepatic:  - neg GI/hepatic ROS     Renal/Genitourinary:     (+) renal disease, type: CRI,            Endo:  - neg endo ROS     Psychiatric/Substance Use:  - neg psychiatric ROS     Infectious Disease:  - neg infectious disease ROS     Malignancy:  - neg malignancy ROS     Other:            Physical Exam    Airway        Mallampati: I   TM distance: > 3 FB   Neck ROM: full     Respiratory Devices and Support         Dental  no notable dental history         Cardiovascular   cardiovascular exam normal          Pulmonary   pulmonary exam normal                OUTSIDE LABS:  CBC:   Lab Results   Component Value Date    HGB 11.5 (L) 07/17/2021     BMP:   Lab Results   Component Value Date     07/17/2021    POTASSIUM 5.3 (H) 07/17/2021    CHLORIDE 104 07/17/2021    CO2 26 07/17/2021    BUN 31 (H) 07/17/2021    CR 1.48 (H) 07/17/2021     07/17/2021     COAGS: No results found for: \"PTT\", \"INR\", \"FIBR\"  POC: No results found for: \"BGM\", \"HCG\", \"HCGS\"  HEPATIC: No results found for: \"ALBUMIN\", \"PROTTOTAL\", \"ALT\", \"AST\", \"GGT\", \"ALKPHOS\", \"BILITOTAL\", \"BILIDIRECT\", \"FELECIA\"  OTHER:   Lab Results   Component Value Date    EMILY 9.0 07/17/2021       Anesthesia Plan    ASA Status:  3       Anesthesia Type: Spinal.              Consents    Anesthesia Plan(s) and associated risks, benefits, and realistic alternatives discussed. Questions answered and patient/representative(s) expressed understanding.     - Discussed:     - Discussed with:  Patient            Postoperative Care    Pain management: Peripheral " nerve block (Single Shot).        Comments:    Other Comments: Add tejas WILSON MD

## 2024-05-29 NOTE — CONSULTS
[facility] MEDICINE CONSULT NOTE   Physician requesting consult: Fortunato Phillips MD    Reason for consult: Postoperative medical management of medical co-morbidities as below    Identification/Summary:   Maryann Corrales is a 83 year old female with a PMH of HTN, CKD 3A, overactive bladder, subarachnoid hemorrhage, vertebral artery coiling, esophageal stricture with hiatal hernia, ovarian cancer, breast cancer, hyperlipidemia and Raynaud's.  Underwent left total knee arthroplasty.     Assessment and Plan:    Status post left total knee arthroplasty  Postoperative management per orthopedics.  Preoperative hemoglobin 12.5.    Chronic kidney disease stage IIIa  Preoperative creatinine 1.1.  Run IV fluids through the evening.  Recheck BMP in the morning.    Essential hypertension  Order home lisinopril hydrochlorothiazide to start on 5/30 with hold parameters.    Overactive bladder  Hold patient's Ditropan at this time.  Monitor urine output.    History of esophageal stricture/hiatal hernia  Patient denies any issues as long as she is chewing slowly.  Declined offer for PPI.  Will monitor if any heartburn symptoms.    History of subarachnoid hemorrhage  History of vertebral artery coiling  History of ovarian cancer  History of breast cancer  History of Raynaud's  Noted.    Anticoagulation.  Positive family history factor V Leiden deficiency  Aspirin 81 mg twice daily ordered by surgery.  Routine postoperative risk.  COVID-19 PCR not tested    Fluids: To run through the evening  Pain meds: Per surgery  Therapy: Per surgery  Gann:Not present  Lines: None       Current Diet  Orders Placed This Encounter      Advance Diet as Tolerated: Regular Diet Adult      Discharge Instruction - Regular Diet Adult    Supplements  None      Disposition  Per surgery    Code status:Full Code   [facility] service was asked to evaluate patient for postoperative medical management as follows below. Please resume the  "home medications as reconciled and further noted with ordered hold parameters.  Thank you for this consult; we will continue to follow this patient until discharge.    Procedure(s):  LEFT TOTAL KNEE ARTHROPLASTY  Day of Surgery  Estimated Blood Loss:  * No values recorded between 5/29/2024 12:26 PM and 5/29/2024  1:27 PM *  Hospital Problem List   No problem-specific Assessment & Plan notes found for this encounter.    Active Problems:    Knee osteoarthritis    CKD (chronic kidney disease) stage 3, GFR 30-59 ml/min (H)    Essential hypertension    Overactive bladder    Raynaud's syndrome      -Reviewed the patient's preoperative H and P and updated missing elements.  -Home medication reconciliation has been reviewed.  Medications have been ordered as noted from the home list and changes are documented above     Clinically Significant Risk Factors Present on Admission                  # Hypertension: Noted on problem list      # Overweight: Estimated body mass index is 28.7 kg/m  as calculated from the following:    Height as of this encounter: 1.6 m (5' 3\").    Weight as of this encounter: 73.5 kg (162 lb).              HISTORY     Maryann Corrales is a 83 year old female with PMH of HTN, CKD 3A, overactive bladder, subarachnoid hemorrhage, vertebral artery coiling, esophageal stricture with hiatal hernia, ovarian cancer, breast cancer, hyperlipidemia and Raynaud's.  Underwent left total knee arthroplasty.  Patient doing well after surgery.  Pain under good control.  No chest pain.  No shortness of breath.  No nausea or vomiting.  Patient notes that with her stricture she is not on any medications and it has been quite sometime since her last dilation.  She denies any issues with swallowing as long as she is eating slowly and chewing thoroughly.  Declined any offers for PPIs.  Denies any deficits from her hemorrhage/coiling.  Denies personal history of heart attack, stroke, diabetes, DVT, PE, peptic ulcer disease or " sleep apnea.  .  Questions answered to verbalized satisfaction.    Past Medical History     Past Medical History:  No date: Arthritis  12/06/2006: Basilar artery syndrome      Comment:  Formatting of this note might be different from the                original. WHICH REQUIRED COLING  No date: Brain aneurysm  11/07/2017: Breast neoplasm, Tis (DCIS), left      Comment:  Formatting of this note might be different from the                original. Added automatically from request for surgery                [id number] Diagnosed 2017. S/p lumpectomy 11/2017. DCIS. On                tamoxifen since Dec 2017.  No date: Cerebral artery occlusion with cerebral infarction (H)  05/26/2015: CKD (chronic kidney disease) stage 3, GFR 30-59 ml/min (H)  07/16/2021: Dyslipidemia  12/06/2006: Essential hypertension  12/06/2006: Hiatal hernia  No date: Hypertension  No date: Macular degeneration  10/18/2019: Malignant neoplasm of right ovary (H)      Comment:  Formatting of this note might be different from the                original. Endometroid adenocarcinoma of right ovary,                stage IA s/p MORALES/BSO/omentectomy with Dr Alfaro. Follows               with [facility]  No date: Overactive bladder  07/16/2021: Raynaud's syndrome  04/15/2014: Recurrent dislocation of right hip  12/06/2006: Stricture and stenosis of esophagus  12/06/2006: Subarachnoid hemorrhage (H)     Patient Active Problem List    Diagnosis Date Noted    Knee osteoarthritis 07/16/2021     Priority: Medium    Ductal carcinoma in situ (DCIS) of right breast 07/16/2021     Priority: Medium     Formatting of this note might be different from the original.  Diagnosed 2014      Dyslipidemia 07/16/2021     Priority: Medium    Raynaud's syndrome 07/16/2021     Priority: Medium    Vitamin D deficiency 07/16/2021     Priority: Medium    Malignant neoplasm of right ovary (H) 10/18/2019     Priority: Medium     Formatting of this note might be different from the  original.  Endometroid adenocarcinoma of right ovary, stage IA s/p MORALES/BSO/omentectomy with Dr Alfaro. Follows with [facility]      Breast neoplasm, Tis (DCIS), left 11/07/2017     Priority: Medium     Formatting of this note might be different from the original.  Added automatically from request for surgery [id number]  Diagnosed 2017. S/p lumpectomy 11/2017. DCIS.  On tamoxifen since Dec 2017.      AMD (age-related macular degeneration), bilateral 01/26/2016     Priority: Medium    CKD (chronic kidney disease) stage 3, GFR 30-59 ml/min (H) 05/26/2015     Priority: Medium    Overactive bladder 05/19/2015     Priority: Medium    Recurrent dislocation of right hip 04/15/2014     Priority: Medium    Basilar artery syndrome 12/06/2006     Priority: Medium     Formatting of this note might be different from the original.  WHICH REQUIRED COLING      Essential hypertension 12/06/2006     Priority: Medium    Hiatal hernia 12/06/2006     Priority: Medium    Stricture and stenosis of esophagus 12/06/2006     Priority: Medium    Subarachnoid hemorrhage (H) 12/06/2006     Priority: Medium     Surgical History     Past Surgical History:   Procedure Laterality Date    ARTHROPLASTY KNEE Right 07/16/2021    Procedure: TOTAL KNEE ARTHROPLASTY, RIGHT;  Surgeon: Fortunato Phillips MD;  Location: [facility] Main OR    DILATION AND CURETTAGE      Gluteus tendon repair      HYSTERECTOMY SUPRACERVICAL, BILATERAL SALPINGO-OOPHORECTOMY, COMBINED      With lymph node resection    LUMPECTOMY BREAST      ORTHOPEDIC SURGERY      Vertebral arterial coiling       Family History      Family History   Problem Relation Age of Onset    Breast Cancer Mother     Factor V Leiden deficiency Sister       Social History      Social History     Tobacco Use    Smoking status: Former    Smokeless tobacco: Never   Vaping Use    Vaping status: Never Used   Substance Use Topics    Alcohol use: Yes     Comment: 0-2 etoh per week    Drug use: Not Currently       Allergies     Allergies   Allergen Reactions    Adhesive Tape Rash     Pt unsure. Redness on skin after removal of surgical dressing    Latex Rash     Pt unsure       Prior to Admission Medications      Prior to Admission Medications   Prescriptions Last Dose Informant Patient Reported? Taking?   Multiple Vitamins-Minerals (PRESERVISION AREDS) TABS Past Week  Yes Yes   Sig: Take 1 tablet by mouth 2 times daily   acetaminophen (TYLENOL) 500 MG tablet Unknown at prn  Yes Yes   Sig: Take 500-1,000 mg by mouth every 6 hours as needed for mild pain   lisinopril-hydrochlorothiazide (ZESTORETIC) 20-12.5 MG tablet 5/28/2024 at am  Yes Yes   Sig: Take 1 tablet by mouth daily   oxybutynin ER (DITROPAN-XL) 10 MG 24 hr tablet 5/29/2024 at am  Yes Yes   Sig: Take 10 mg by mouth daily      Facility-Administered Medications: None      Review of Systems     A 12 point comprehensive review of systems was negative except as noted above in HPI.    OBJECTIVE         Physical Exam   Temp:  [95.2  F (35.1  C)-98  F (36.7  C)] 97.6  F (36.4  C)  Pulse:  [52-68] 63  Resp:  [14-39] 20  BP: (105-166)/(53-72) 166/72  SpO2:  [93 %-100 %] 98 %  Body mass index is 28.7 kg/m .  Constitutional: awake, alert, cooperative, no apparent distress, and appears stated age  Eyes: Lids and lashes normal, pupils equal, round and reactive to light, extra ocular muscles intact, sclera clear, conjunctiva normal  ENT: Normocephalic, without obvious abnormality, atraumatic, sinuses nontender on palpation, external ears without lesions, oral pharynx with moist mucous membranes, tonsils without erythema or exudates, gums normal and good dentition.  Hematologic / Lymphatic: no cervical lymphadenopathy and no supraclavicular lymphadenopathy  Respiratory: No increased work of breathing, good air exchange, clear to auscultation bilaterally, no crackles or wheezing  Cardiovascular: Normal apical impulse, regular rate and rhythm, normal S1 and S2, no S3 or S4, and no  "murmur noted  GI: No scars, normal bowel sounds, soft, non-distended, non-tender, no masses palpated, no hepatosplenomegally  Skin: normal skin color, texture, turgor, no redness, warmth, or swelling, and no rashes  Musculoskeletal: There is no redness, warmth, or swelling of the joints.  Full range of motion noted.  Motor strength Tone is normal. no lower extremity pitting edema present  Neurologic: Cranial nerves II-XII are grossly intact. Sensory:  Sensory intact  Neuropsychiatric: General: normal, calm, and normal eye contact Level of consciousness: alert / normal Affect: normal Orientation: oriented to self, place, time and situation Memory and insight: normal, memory for past and recent events intact, and thought process normal          Imaging Reviewed Personally By Myself      Radiology Results:   Recent Results (from the past 24 hour(s))   POC US Guidance Needle Placement    Narrative    Ultrasound was performed as guidance to an anesthesia procedure.  Click   \"PACS images\" hyperlink below to view any stored images.  For specific   procedure details, view procedure note authored by anesthesia.       Labs Reviewed Personally By Myself     Results for orders placed or performed during the hospital encounter of 05/29/24 (from the past 24 hour(s))   POC US Guidance Needle Placement    Narrative    Ultrasound was performed as guidance to an anesthesia procedure.  Click   \"PACS images\" hyperlink below to view any stored images.  For specific   procedure details, view procedure note authored by anesthesia.   Basic metabolic panel   Result Value Ref Range    Sodium 143 135 - 145 mmol/L    Potassium 3.6 3.4 - 5.3 mmol/L    Chloride 105 98 - 107 mmol/L    Carbon Dioxide (CO2) 27 22 - 29 mmol/L    Anion Gap 11 7 - 15 mmol/L    Urea Nitrogen 20.1 8.0 - 23.0 mg/dL    Creatinine 1.02 (H) 0.51 - 0.95 mg/dL    GFR Estimate 54 (L) >60 mL/min/1.73m2    Calcium 8.9 8.8 - 10.2 mg/dL    Glucose 116 (H) 70 - 99 mg/dL   Extra " Tube    Narrative    The following orders were created for panel order Extra Tube.  Procedure                               Abnormality         Status                     ---------                               -----------         ------                     Extra Purple Top Tube[[id number]]                            Final result                 Please view results for these tests on the individual orders.   Extra Purple Top Tube   Result Value Ref Range    Hold Specimen [facility]        Preoperative Labs Reviewed Personally By Myself           HEMOGLOBIN (05/23/2024 11:40 AM CDT)  Results - HEMOGLOBIN (05/23/2024 11:40 AM CDT)  Component Value Ref Range Test Method Analysis Time Performed At [facility]   HEMOGLOBIN 12.5 12.0 - 16.0 g/dL   05/23/2024 11:53 AM CDT [facility]     MCV 88 80 - 100 fL   05/23/2024 11:53 AM CDT [facility]       Results - HEMOGLOBIN (05/23/2024 11:40 AM CDT)  Specimen (Source) Anatomical Location / Laterality Collection Method / Volume Collection Time Received Time   Blood BLOOD SPECIMEN / Unknown Venipuncture / Unknown 05/23/2024 11:40 AM CDT 05/23/2024 11:41 AM CDT     Results - HEMOGLOBIN (05/23/2024 11:40 AM CDT)  Narrative         Results - HEMOGLOBIN (05/23/2024 11:40 AM CDT)  Authorizing Provider Result Type   Zara Yuen NP HEMATOLOGY     Results - HEMOGLOBIN (05/23/2024 11:40 AM CDT)  Performing Organization Address City/State/ZIP Code Phone Number   [facility]  [address] [place] [address] [phone]       Back to top of Results       (ABNORMAL) BASIC METABOLIC PANEL (05/23/2024 11:40 AM CDT)  Results - (ABNORMAL) BASIC METABOLIC PANEL (05/23/2024 11:40 AM CDT)  Component Value Ref Range Test Method Analysis Time Performed At [facility]   SODIUM 142 136 - 145 mmol/L   05/23/2024 8:11 PM CDT [facility]      POTASSIUM 4.3 3.5 - 5.1 mmol/L   05/23/2024 8:11 PM CDT [facility]     CHLORIDE 102 98 - 107 mmol/L   05/23/2024 8:11 PM [facility]     CO2,TOTAL 28 22 - 29 mmol/L   05/23/2024 8:11 PM [facility]     ANION GAP 12 5 - 18   05/23/2024 8:11 PM [facility]     GLUCOSE 93 70 - 99 mg/dL   [facility]23[facility]2024 8[facility]     CALCIUM 9.5 8.8 - 10.2 mg/dL   05/23/2024 8:11 PM [facility]     BUN 27 (H) 8 - 23 mg/dL   05/23/2024 8:11 PM [facility]     CREATININE 1.10 (H) 0.50 - 0.90 mg/dL   05/23/2024 8:11 PM [facility]     BUN/CREAT RATIO 25 (H) 10 - 20   05/23/2024 8:11 PM [facility]     eGFR 49 (L) >90 mL/min/1.73m2   05[facility]23/2024 8[facility]     Comment: As of 03/15/2022, eGFR is calculated by the CKD-EPI creatinine equation without race adjustment.  eGFR can be influenced by muscle mass, exercise, and diet.  The reported eGFR is an estimation only and is only applicable if the renal function is stable.     Results - (ABNORMAL) BASIC METABOLIC PANEL (05/23/2024 11:40 AM CDT)  Specimen (Source) Anatomical Location / Laterality Collection Method / Volume Collection Time Received Time   Blood BLOOD SPECIMEN / Unknown Venipuncture / Unknown 05/23/2024 11:40 AM CDT 05/23/2024 11:41 AM CDT           Thank you for this consultation.  Appreciate the opportunity to participate in the care of Maryann ROBERT MaciasAntoni, please feel free to contact us for any questions or concerns.    Rj Major MD  [facility]  Phone: #[phone]

## 2024-05-29 NOTE — ANESTHESIA POSTPROCEDURE EVALUATION
Patient: Maryann Corrales    Procedure: Procedure(s):  LEFT TOTAL KNEE ARTHROPLASTY       Anesthesia Type:  Spinal    Note:  Disposition: Inpatient   Postop Pain Control: Uneventful            Sign Out: Well controlled pain   PONV: No   Neuro/Psych: Uneventful            Sign Out: Acceptable/Baseline neuro status   Airway/Respiratory: Uneventful            Sign Out: Acceptable/Baseline resp. status   CV/Hemodynamics: Uneventful            Sign Out: Acceptable CV status; No obvious hypovolemia; No obvious fluid overload   Other NRE: NONE   DID A NON-ROUTINE EVENT OCCUR? No           Last vitals:  Vitals Value Taken Time   /66 05/29/24 1500   Temp 35.2  C (95.36  F) 05/29/24 1411   Pulse 68 05/29/24 1503   Resp 32 05/29/24 1410   SpO2 97 % 05/29/24 1503   Vitals shown include unfiled device data.    Electronically Signed By: ZHANNA WILSON MD  May 29, 2024  3:04 PM

## 2024-05-29 NOTE — INTERVAL H&P NOTE
"I have reviewed the surgical (or preoperative) H&P that is linked to this encounter, and examined the patient. There are no significant changes    Clinical Conditions Present on Arrival:  Clinically Significant Risk Factors Present on Admission                  # Overweight: Estimated body mass index is 28.7 kg/m  as calculated from the following:    Height as of this encounter: 1.6 m (5' 3\").    Weight as of this encounter: 73.5 kg (162 lb).       "

## 2024-05-30 ENCOUNTER — APPOINTMENT (OUTPATIENT)
Dept: PHYSICAL THERAPY | Facility: CLINIC | Age: 84
End: 2024-05-30
Attending: ORTHOPAEDIC SURGERY
Payer: MEDICARE

## 2024-05-30 VITALS
SYSTOLIC BLOOD PRESSURE: 132 MMHG | DIASTOLIC BLOOD PRESSURE: 60 MMHG | TEMPERATURE: 98 F | OXYGEN SATURATION: 95 % | BODY MASS INDEX: 28.7 KG/M2 | HEART RATE: 65 BPM | WEIGHT: 162 LBS | HEIGHT: 63 IN | RESPIRATION RATE: 18 BRPM

## 2024-05-30 LAB
ANION GAP SERPL CALCULATED.3IONS-SCNC: 12 MMOL/L (ref 7–15)
BUN SERPL-MCNC: 23.3 MG/DL (ref 8–23)
CALCIUM SERPL-MCNC: 9.2 MG/DL (ref 8.8–10.2)
CHLORIDE SERPL-SCNC: 103 MMOL/L (ref 98–107)
CREAT SERPL-MCNC: 1.08 MG/DL (ref 0.51–0.95)
DEPRECATED HCO3 PLAS-SCNC: 26 MMOL/L (ref 22–29)
EGFRCR SERPLBLD CKD-EPI 2021: 50 ML/MIN/1.73M2
FASTING STATUS PATIENT QL REPORTED: ABNORMAL
GLUCOSE SERPL-MCNC: 155 MG/DL (ref 70–99)
GLUCOSE SERPL-MCNC: 164 MG/DL (ref 70–99)
HGB BLD-MCNC: 10.4 G/DL (ref 11.7–15.7)
POTASSIUM SERPL-SCNC: 4.4 MMOL/L (ref 3.4–5.3)
SODIUM SERPL-SCNC: 141 MMOL/L (ref 135–145)

## 2024-05-30 PROCEDURE — 250N000011 HC RX IP 250 OP 636: Performed by: ORTHOPAEDIC SURGERY

## 2024-05-30 PROCEDURE — 97530 THERAPEUTIC ACTIVITIES: CPT | Mod: GP

## 2024-05-30 PROCEDURE — 97161 PT EVAL LOW COMPLEX 20 MIN: CPT | Mod: GP

## 2024-05-30 PROCEDURE — 36415 COLL VENOUS BLD VENIPUNCTURE: CPT | Performed by: FAMILY MEDICINE

## 2024-05-30 PROCEDURE — 999N000111 HC STATISTIC OT IP EVAL DEFER

## 2024-05-30 PROCEDURE — 250N000013 HC RX MED GY IP 250 OP 250 PS 637: Performed by: FAMILY MEDICINE

## 2024-05-30 PROCEDURE — 80048 BASIC METABOLIC PNL TOTAL CA: CPT | Performed by: ORTHOPAEDIC SURGERY

## 2024-05-30 PROCEDURE — 82947 ASSAY GLUCOSE BLOOD QUANT: CPT | Performed by: FAMILY MEDICINE

## 2024-05-30 PROCEDURE — 97116 GAIT TRAINING THERAPY: CPT | Mod: GP

## 2024-05-30 PROCEDURE — 250N000013 HC RX MED GY IP 250 OP 250 PS 637: Performed by: ORTHOPAEDIC SURGERY

## 2024-05-30 PROCEDURE — 85018 HEMOGLOBIN: CPT | Performed by: ORTHOPAEDIC SURGERY

## 2024-05-30 RX ADMIN — OXYCODONE 5 MG: 5 TABLET ORAL at 08:20

## 2024-05-30 RX ADMIN — SENNOSIDES AND DOCUSATE SODIUM 1 TABLET: 50; 8.6 TABLET ORAL at 08:20

## 2024-05-30 RX ADMIN — ASPIRIN 81 MG: 81 TABLET, COATED ORAL at 08:19

## 2024-05-30 RX ADMIN — CEFAZOLIN SODIUM 2 G: 2 INJECTION, SOLUTION INTRAVENOUS at 03:51

## 2024-05-30 RX ADMIN — POLYETHYLENE GLYCOL 3350 17 G: 17 POWDER, FOR SOLUTION ORAL at 08:20

## 2024-05-30 RX ADMIN — LISINOPRIL AND HYDROCHLOROTHIAZIDE 1 TABLET: 12.5; 2 TABLET ORAL at 08:19

## 2024-05-30 RX ADMIN — ACETAMINOPHEN 975 MG: 325 TABLET ORAL at 08:19

## 2024-05-30 ASSESSMENT — ACTIVITIES OF DAILY LIVING (ADL)
ADLS_ACUITY_SCORE: 29

## 2024-05-30 NOTE — PLAN OF CARE
Problem: Adult Inpatient Plan of Care  Goal: Absence of Hospital-Acquired Illness or Injury  Intervention: Identify and Manage Fall Risk  Recent Flowsheet Documentation  Taken 5/30/2024 0012 by Chevy Le RN  Safety Promotion/Fall Prevention:   safety round/check completed   room near nurse's station   room door open   nonskid shoes/slippers when out of bed   mobility aid in reach   assistive device/personal items within reach   Goal Outcome Evaluation:      Plan of Care Reviewed With: patient    Overall Patient Progress: improvingOverall Patient Progress: improving  Patient vital signs are at baseline: Yes  Patient able to ambulate as they were prior to admission or with assist devices provided by therapies during their stay:  Yes  Patient MUST void prior to discharge:  Yes  Patient able to tolerate oral intake:  Yes  Pain has adequate pain control using Oral analgesics:  Yes  Does patient have an identified :  Yes  Has goal D/C date and time been discussed with patient:  Yes  Pt alert and oriented, vss on room air, pain managed, voided and ambulated, potential discharge tomorrow.             
DISCHARGE LOUNGE NOTE    Patient discharged to home at 11:01 AM via wheel chair. Accompanied by daughter and staff. Discharge instructions reviewed with patient and daughter, opportunity offered to ask questions. Prescriptions sent to patients preferred pharmacy, 1 paper script sent with pt. All belongings sent with patient. PIV removed. Daughter to transport     Miky Varner RN         
Physical Therapy Discharge Summary    Reason for therapy discharge:    All goals and outcomes met, no further needs identified.    Progress towards therapy goal(s). See goals on Care Plan in [facility] electronic health record for goal details.  Goals met    Therapy recommendation(s):    Continued therapy is recommended.  Rationale/Recommendations:  continued PT with outpatient PT to improve functional mobility.  Continue home exercise program.      
Satisfactory

## 2024-05-30 NOTE — PROGRESS NOTES
[facility]  OUTPATIENT PHYSICAL THERAPY EVALUATION  PLAN OF TREATMENT FOR OUTPATIENT REHABILITATION  (COMPLETE FOR INITIAL CLAIMS ONLY)  Patient's Last Name, First Name, M.I.  YOB: 1940  Maryann Corrales                        Provider's Name  [facility] Medical Record No.  [id number]                             Onset Date:  05/29/24   Start of Care Date:  05/30/24   Type:     _X_PT   ___OT   ___SLP Medical Diagnosis:  Knee osteoarthritis              PT Diagnosis:  impaired functional mobility Visits from SOC:  1     See note for plan of treatment, functional goals and certification details    I CERTIFY THE NEED FOR THESE SERVICES FURNISHED UNDER        THIS PLAN OF TREATMENT AND WHILE UNDER MY CARE     (Physician co-signature of this document indicates review and certification of the therapy plan).                 05/30/24 0830   Appointment Info   Signing Clinician's Name / Credentials (PT) Syeda Bennett, PT, DPT   Quick Adds   Quick Adds Certification   Living Environment   People in Home alone   Current Living Arrangements house;other (see comments)  ([facility])   Home Accessibility stairs to enter home;stairs within home   Number of Stairs, Main Entrance 3   Stair Railings, Main Entrance railings safe and in good condition   Number of Stairs, Within Home, Primary greater than 10 stairs   Stair Railings, Within Home, Primary railings safe and in good condition   Living Environment Comments Pt reports has family and friends who are able to assist at home. Pt reports can stay on main level, but can use chair lift to get to second floor   Self-Care   Equipment Currently Used at Home cane, straight;walker, rolling   Activity/Exercise/Self-Care Comment Pt reports has walk in shower and raised toilet seat   General Information   Onset of Illness/Injury or Date of Surgery 05/29/24   Referring Physician Fortunato Phillips MD   Patient/Family  Therapy Goals Statement (PT) to get better   Pertinent History of Current Problem (include personal factors and/or comorbidities that impact the POC) 83 year old female with a PMH of HTN, CKD 3A, overactive bladder, subarachnoid hemorrhage, vertebral artery coiling, esophageal stricture with hiatal hernia, ovarian cancer, breast cancer, hyperlipidemia and Raynaud's.  Underwent left total knee arthroplasty.   Existing Precautions/Restrictions weight bearing   Weight-Bearing Status - LLE weight-bearing as tolerated   Weight-Bearing Status - RLE full weight-bearing   Transfers   Transfers sit-stand transfer   Comment, (Transfers) SBA with FWW for sit<>stand transfers. Verbal cues for safety and safe hand placement.   Sit-Stand Transfer   Sit-Stand [place] (Transfers) supervision;verbal cues   Assistive Device (Sit-Stand Transfers) walker, front-wheeled   Comment, (Sit-Stand Transfer) SBA with FWW for sit<>stand transfers. Verbal cues for safety and safe hand placement.   Gait/Stairs (Locomotion)   [place] Level (Gait) supervision;verbal cues   Assistive Device (Gait) walker, front-wheeled   Distance in Feet (Gait) 10'   Pattern (Gait) step-to   Deviations/Abnormal Patterns (Gait) base of support, narrow;dixie decreased;gait speed decreased   Negotiation (Stairs) stairs independence;handrail location;number of steps;ascending technique;descending technique   [place] Level (Stairs) supervision;verbal cues   Handrail Location (Stairs) both sides   Number of Steps (Stairs) 4 steps   Ascending Technique (Stairs) step-to-step   Descending Technique (Stairs) step-to-step   Comment, (Gait/Stairs) Pt ambulates with SBA and FWW. Verbal cues for safety and posture. Pt negotiates 4 steps with SBA and bilateral railings. Verbal cues for safety and safe step technique.   Clinical Impression   Criteria for Skilled Therapeutic Intervention Yes, treatment indicated   PT Diagnosis (PT) impaired functional mobility    Influenced by the following impairments weakness, pain   Functional limitations due to impairments transfers, ambulation, stairs   Clinical Presentation (PT Evaluation Complexity) stable   Clinical Presentation Rationale Pt presents as medically diagnosed   Clinical Decision Making (Complexity) low complexity   Planned Therapy Interventions (PT) balance training;bed mobility training;gait training;home exercise program;patient/family education;ROM (range of motion);stair training;strengthening;stretching;transfer training   Risk & Benefits of therapy have been explained evaluation/treatment results reviewed;care plan/treatment goals reviewed;patient;daughter   PT Total Evaluation Time   PT Eval, Low Complexity Minutes (43498) 10   Therapy Certification   Start of care date 05/30/24   Certification date from 05/30/24   Certification date to 06/28/24   Medical Diagnosis Knee osteoarthritis   Physical Therapy Goals   PT Frequency One time eval and treatment only   PT Predicted Duration/Target Date for Goal Attainment 05/30/24   PT Goals Transfers;Gait;Stairs   PT: Transfers Supervision/stand-by assist;Sit to/from stand;Assistive device;Goal Met  (FWW)   PT: Gait Supervision/stand-by assist;Assistive device;Rolling walker;150 feet;Goal Met  (FWW)   PT: Stairs Supervision/stand-by assist;4 stairs;Rail on both sides;Goal Met   Interventions   Interventions Quick Adds Gait Training;Therapeutic Activity;Therapeutic Procedure   Therapeutic Procedure/Exercise   Treatment Detail/Skilled Intervention Pt provided with TKA protocol exercise handout. Pt verbalizes and demonstrates understanding of exercises.   Therapeutic Activity   Therapeutic Activities: dynamic activities to improve functional performance Minutes (66886) 10   Treatment Detail/Skilled Intervention Pt sitting up in chair. SBA with FWW for sit<>stand transfers. Verbal cues for safety and safe hand placement. Education on icing at home and importance of  ambulation and exercise before outpatient PT appt. Pt sitting up in chair at end of session with chair alarm on and call light within reach.   Gait Training   Gait Training Minutes (91256) 15   Treatment Detail/Skilled Intervention Pt ambulates with SBA and FWW. Verbal cues for safety and posture. Pt negotiates 4 steps with bilateral railings and SBA. Verbal cues for safety and safe step technique. Pt reports prefers to go down first with RLE, due to history of hip surgeries and weakness. Education on safe technique.   Distance in Feet 150'   [place] Level (Gait Training) stand-by assist   Physical Assistance Level (Gait Training) supervision;verbal cues   Weight Bearing (Gait Training) weight-bearing as tolerated   Assistive Device (Gait Training) rolling walker   Pattern Analysis (Gait Training) swing-to gait   Gait Analysis Deviations decreased dixie;decreased step length;decreased toe-to-floor clearance   Impairments (Gait Analysis/Training) pain;strength decreased   Stair Railings present at both sides   Physical Assist/Nonphysical Assist (Stairs) supervision;verbal cues   Level of [place] (Stairs) stand-by assist   PT Discharge Planning   PT Plan discharge PT   PT Discharge Recommendation (DC Rec) (S)  other (see comments)  (defer to ortho)   PT Rationale for DC Rec Pt reports good home support and good home setup. Pt reports has FWW at home and plans on completing outpatient PT upon discharge.   PT Brief overview of current status SBA with mobility, 150 feet with FWW, 4 steps with bilateral railings   Total Session Time   Timed Code Treatment Minutes 25   Total Session Time (sum of timed and untimed services) 35     Syeda Bennett, PT, DPT

## 2024-05-30 NOTE — CARE PLAN
Patient vital signs are at baseline: Yes  Patient able to ambulate as they were prior to admission or with assist devices provided by therapies during their stay:  Yes  Patient MUST void prior to discharge:  Yes  Patient able to tolerate oral intake:  Yes  Pain has adequate pain control using Oral analgesics:  Yes  Does patient have an identified :  Yes  Has goal D/C date and time been discussed with patient:  Yes      Patient is alert and oriented timed 4. CMS intact. VSS. Voided during shift. Called appropriately for RN and NA. Ambulated during shift. Passed PT and OT. Wheel chaired pt to discharge lounge. Discharge RN taken over for care. Daughter is bedside.

## 2024-05-30 NOTE — PROGRESS NOTES
Patient vital signs are at baseline: Yes  Patient able to ambulate as they were prior to admission or with assist devices provided by therapies during their stay:  Yes  walked  Patient MUST void prior to discharge:  Yes  Voiding without difficulty  Patient able to tolerate oral intake:  Yes  Pain has adequate pain control using Oral analgesics:  Yes  Does patient have an identified :  Yes  Has goal D/C date and time been discussed with patient:  Yes

## 2024-05-30 NOTE — DISCHARGE SUMMARY
"ORTHOPEDIC DISCHARGE SUMMARY       Maryann Corrales,  1940, MRN [id number]    Admission Date: 2024      Admission Diagnoses: Osteoarthritis of left knee [M17.12]     Discharge Date:  2024    Post-operative Day:  1 Day Post-Op    Reason for Admission: The patient was admitted for the following: Procedure(s):  LEFT TOTAL KNEE ARTHROPLASTY    BRIEF HOSPITAL COURSE   Maryann Corrales is a pleasant 83 year old female who underwent the aforementioned procedure with Dr. Phillips on 24. There were no intraoperative complications and the patient was transferred to the recovery room and later the orthopedic unit in stable condition. Once the patient reached the orthopedic floor our orthopedic pain protocol was implemented along with the following:    Anticoagulation Medications: ASA  Therapy: PT and OT  Activity: WBAT  Bracing: None    Consultations during Admission: Hospitalist service for medical management     COMPLICATIONS/SIGNIFICANT FINDINGS        DISCHARGE INFORMATION   Condition at discharge: Good  Discharge destination: Home  Patient was seen by myself on the date of discharge.    FOLLOW UP CARE   Follow up with orthopedics in 2 weeks or sooner should the need arise. Ortho will continue to manage pain control, post op anticoagulation and incision care.     Follow up with your PCP for management of chronic medical problems and to evaluate for post op medical complications including constipation, nausea/vomiting, DVT/PE, anemia, changes in blood pressure, fevers/chills, urinary retention and atelectasis/pneumonia.     Subjective   Patient is doing well on POD #1. Pain is well controlled with oral medications. Ambulating. Tolerating oral intake.     Physical Exam   /60 (BP Location: Right arm)   Pulse 65   Temp 98  F (36.7  C) (Oral)   Resp 18   Ht 1.6 m (5' 3\")   Wt 73.5 kg (162 lb)   SpO2 95%   BMI 28.70 kg/m    The patient is A&Ox3. Appears comfortable, sitting up at " bedside.  Sensation is intact to light touch & equal bilaterally in the L2 through S1 dermatomes.  Calves are soft and non-tender.  Dorsiflexion and plantar flexion is intact bilaterally.  Appropriate flexion and extension of the toes bilaterally.   Brisk capillary refill in the toes bilaterally.   Palpable left dorsalis pedis pulse.  left knee dressing C/D/I.        Pertinent Results at Discharge     Hemoglobin   Date/Time Value Ref Range Status   05/30/2024 07:05 AM 10.4 (L) 11.7 - 15.7 g/dL Final   07/17/2021 08:32 AM 11.5 (L) 11.7 - 15.7 g/dL Final       Problem List   Active Problems:    Knee osteoarthritis    CKD (chronic kidney disease) stage 3, GFR 30-59 ml/min (H)    Essential hypertension    Overactive bladder    Raynaud's syndrome      Libby Ang PA-C/Dr. Phillips  [facility]  [phone]  Date: 5/30/2024  Time: 10:05 AM

## 2024-05-30 NOTE — PROGRESS NOTES
Occupational Therapy: Orders received. Chart reviewed and discussed with care team.? Occupational Therapy not indicated due to PT deferring OT as Pt has had previous JEFF and TKA.  Pt expressed no OT concerns.? Defer discharge recommendations to PT and the Ortho Team.? Will complete orders.

## 2024-05-30 NOTE — PROGRESS NOTES
"Orthopedic Progress Note      Assessment: 1 Day Post-Op  S/P Procedure(s):  LEFT TOTAL KNEE ARTHROPLASTY @    Plan:   - Continue PT/OT.   - Weightbearing status: WBAT.  - Anticoagulation: ASA in addition to SCDs, spring stockings and early ambulation.  - Discharge planning: Discharge to home today.     Subjective:  Pain: Well controlled on Tylenol & oxycodone.  Nausea, Vomiting:  No  Chest pain: No  Lightheadedness, Dizziness:  No  Neuro:  Patient denies new onset numbness or paresthesias in left lower extremity     Patient is doing well on POD #1. Ambulating, tolerating oral intake, voiding & pain is controlled with oral medication. Ready for discharge. Hgb 10.4.     Objective:  /60 (BP Location: Right arm)   Pulse 65   Temp 98  F (36.7  C) (Oral)   Resp 18   Ht 1.6 m (5' 3\")   Wt 73.5 kg (162 lb)   SpO2 95%   BMI 28.70 kg/m    The patient is A&Ox3. Appears comfortable, sitting up at bedside.  Sensation is intact to light touch & equal bilaterally in the L2 through S1 dermatomes.  Calves are soft and non-tender.  Dorsiflexion and plantar flexion is intact bilaterally.  Appropriate flexion and extension of the toes bilaterally.   Brisk capillary refill in the toes bilaterally.   Palpable left dorsalis pedis pulse.  left knee dressing C/D/I.       Pertinent Labs   Lab Results: personally reviewed.   No results found for: \"INR\", \"PROTIME\"  Lab Results   Component Value Date    HGB 10.4 (L) 05/30/2024     Lab Results   Component Value Date     05/30/2024    CO2 26 05/30/2024         Report completed by:  Libby Ang PA-C/[facility]    Date: 5/30/2024  Time: 10:01 AM   "

## (undated) DEVICE — ELECTRODE PATIENT RETURN ADULT L10 FT 2 PLATE CORD 0855C

## (undated) DEVICE — DRILL PIN HEADLESS TROCAR 00-5901-020-00

## (undated) DEVICE — SUTURE VICRYL+ 2-0 27IN CT-1 UND VCP259H

## (undated) DEVICE — SUTURE MONOCRYL 3-0 18 PS2 UND MCP497G

## (undated) DEVICE — DECANTER VIAL 2006S

## (undated) DEVICE — PLATE GROUNDING ADULT W/CORD 9165L

## (undated) DEVICE — SUCTION MANIFOLD NEPTUNE 2 SYS 4 PORT 0702-020-000

## (undated) DEVICE — HOOD SURG T7 PLUS STRL LF DISP 0416-801-200

## (undated) DEVICE — SOL NACL 0.9% INJ 1000ML BAG 2B1324X

## (undated) DEVICE — SUTURE VICRYL+ 2 27IN CP UNDYED VCP195H

## (undated) DEVICE — TOURNIQUET SGL  BLADDER 30"X4" BLUE 5921030135

## (undated) DEVICE — SU STRATAFIX PDS PLUS 1 CT-1 18" SXPP1A404

## (undated) DEVICE — CUSTOM PACK TOTAL KNEE SOP5BTKHEC

## (undated) DEVICE — SOL NACL 0.9% IRRIG 1000ML BOTTLE 2F7124

## (undated) DEVICE — CUSTOM PACK TOTAL KNEE ACCESSORY SOP5BTAHEA

## (undated) DEVICE — DRAPE SHEET REV FOLD 3/4 9349

## (undated) DEVICE — HOLDER LIMB VELCRO OR 0814-1533

## (undated) DEVICE — SOL WATER IRRIG 1000ML BOTTLE 2F7114

## (undated) DEVICE — DRESSING MEPILEX BORDER POST-OP 4X10

## (undated) DEVICE — BLADE SAW SAGITTAL STRK DUAL CUT 4118-135-090

## (undated) DEVICE — GLOVE UNDER INDICATOR PI SZ 8.5 LF 41685

## (undated) DEVICE — SU STRATAFIX PDS PLUS 2-0 SPIRAL CT-1 30CM SXPP1B410

## (undated) DEVICE — GLOVE BIOGEL PI INDICATOR 8.0 LF 41680

## (undated) DEVICE — SU DERMABOND ADVANCED .7ML DNX12

## (undated) DEVICE — GLOVE BIOGEL PI ULTRATOUCH G SZ 8.0 42180

## (undated) DEVICE — CAST PADDING 6IN COTTON WEBRIL STERILE 2554

## (undated) DEVICE — SUTURE VICRYL+ 1 27IN CT-1 UND VCP261H

## (undated) DEVICE — DRESSING MEPILEX ADH 4INX10IN STRL LF 496455

## (undated) DEVICE — SU MONOCRYL 3-0 PS-2 18" UND MCP497G

## (undated) DEVICE — GLOVE BIOGEL PI ULTRATOUCH G SZ 8.5 42185

## (undated) DEVICE — HOOD FLYTE SURGICOOL